# Patient Record
Sex: FEMALE | Race: ASIAN | Employment: FULL TIME | URBAN - METROPOLITAN AREA
[De-identification: names, ages, dates, MRNs, and addresses within clinical notes are randomized per-mention and may not be internally consistent; named-entity substitution may affect disease eponyms.]

---

## 2018-05-09 ENCOUNTER — OFFICE VISIT (OUTPATIENT)
Dept: FAMILY MEDICINE CLINIC | Age: 23
End: 2018-05-09

## 2018-05-09 VITALS
TEMPERATURE: 98.1 F | RESPIRATION RATE: 16 BRPM | OXYGEN SATURATION: 97 % | SYSTOLIC BLOOD PRESSURE: 130 MMHG | WEIGHT: 138.4 LBS | HEIGHT: 58 IN | HEART RATE: 87 BPM | BODY MASS INDEX: 29.05 KG/M2 | DIASTOLIC BLOOD PRESSURE: 76 MMHG

## 2018-05-09 DIAGNOSIS — Z13.220 SCREENING FOR HYPERLIPIDEMIA: ICD-10-CM

## 2018-05-09 DIAGNOSIS — Z13.1 SCREENING FOR DIABETES MELLITUS: ICD-10-CM

## 2018-05-09 DIAGNOSIS — G47.9 SLEEP TROUBLE: ICD-10-CM

## 2018-05-09 DIAGNOSIS — Z13.0 SCREENING FOR DEFICIENCY ANEMIA: ICD-10-CM

## 2018-05-09 DIAGNOSIS — R53.82 CHRONIC FATIGUE: Primary | ICD-10-CM

## 2018-05-09 DIAGNOSIS — M25.561 RIGHT KNEE PAIN, UNSPECIFIED CHRONICITY: ICD-10-CM

## 2018-05-09 RX ORDER — BISMUTH SUBSALICYLATE 262 MG
1 TABLET,CHEWABLE ORAL DAILY
COMMUNITY

## 2018-05-09 NOTE — PROGRESS NOTES
1. Have you been to the ER, urgent care clinic since your last visit? Hospitalized since your last visit? No    2. Have you seen or consulted any other health care providers outside of the [address] [place] [address] since your last visit? Include any pap smears or colon screening. No    Patient would also like a physical exam today; she was advised to discuss physical with Dr. Jerris Sever, as she had other concerns that she wanted to address today as well. Patient aware insurance will only cover physical visit if only preventative care is discussed today. Patient verbalized understanding; will to come back if necessary.

## 2018-05-09 NOTE — PROGRESS NOTES
HISTORY OF PRESENT ILLNESS  Juan Miguel Wheeler is a 23 y.o. female. HPI: Here to get establish care. C/o chronic fatigue. Going on since long time. Also some trouble sleeping since has job changed. 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 Some fatigue related to her sleep pattern./ able to sleep ok it is just change in shift. No mood changes. Some stress related to work as new work environment and trying to adjust. No mood changes. No known thyroid problem. C/o rt knee pain and cracking sound with banding down. No swelling. Generalize pain, mild in intensity. No radiation of pain. Sometimes after 12 hours shift and long hours of work it does get worse with pain. No loss of balance. No radiation of pain. It is currently mild in intensity. discussed high BMI. Discussed diet modification, calorie count and exercise. Discussed importance of weight loss. agree to do exercise and life style modification. Diet and exercise hand out given in AVS.   For now limitation in doing exercise from knee pain but discussed diet modification. Visit Vitals    /76 (BP 1 Location: Left arm, BP Patient Position: Sitting)    Pulse 87    Temp 98.1 °F (36.7 °C) (Oral)    Resp 16    Ht 4' 10.25\" (1.48 m)    Wt 138 lb 6.4 oz (62.8 kg)    SpO2 97%    BMI 28.68 kg/m2     Review medication list, vitals, problem list,allergies. Denies any headache, dizziness, no chest pain or trouble breathing, no arm or leg weakness. No nausea or vomiting, no weight or appetite changes, no depression or anxiety. No urine or bowel complains, no palpitation, no diaphoresis. No abdominal pain. ROS: see HPI     Physical Exam   Constitutional: She is oriented to person, place, and time. No distress. Cardiovascular: Normal rate, regular rhythm and normal heart sounds. Pulmonary/Chest:   CTA   Abdominal: Soft. Bowel sounds are normal. There is no tenderness. Musculoskeletal: She exhibits no edema. Rt knee: no swelling or redness. ROM wnl. Crepitus on flexion    Neurological: She is oriented to person, place, and time. ASSESSMENT and PLAN    ICD-10-CM ICD-9-CM    1. Chronic fatigue: checking labs. More looks like night shifts and work related. R53.82 780.79 CBC W/O DIFF      METABOLIC PANEL, COMPREHENSIVE      TSH 3RD GENERATION      VITAMIN D, 25 HYDROXY   2. Right knee pain, unspecified chronicity: obtain x-ray. Ice application and home exercise. M25.561 719.46 XR KNEE RT MAX 2 VWS   3. Sleep trouble: probably from work. Will observe. G47.9 780.50     works night shift    4. Screening for deficiency anemia Z13.0 V78.1 CBC W/O DIFF   5. Screening for diabetes mellitus Z13.1 V77.1 HEMOGLOBIN A1C WITH EAG   6. Screening for hyperlipidemia Z13.220 V77.91 LIPID PANEL   7. BMI 28.0-28.9,adult: discussed high BMI. Discussed diet modification, calorie count and exercise. Discussed importance of weight loss. agree to do exercise and life style modification. Diet and exercise hand out given in AVS.   See HPI  G85.35 V85.24    Pt understood and agree with the plan   Review HM   Follow-up Disposition:  Return in about 3 months (around 8/9/2018).

## 2018-05-09 NOTE — MR AVS SNAPSHOT
[address] [address] [facility] 
[phone] Patient: Lacey Solorzano MRN: [id number] :1995 Visit Information Date & Time Provider Department Dept. Phone Encounter #  
 2018  1:30 PM Renny Vera, [address] [facility] [phone] [id number] Follow-up Instructions Return in about 3 months (around 2018). Upcoming Health Maintenance Date Due  
 HPV Age 9Y-34Y (1 of 1 - Female 3 Dose Series) 2004 Influenza Age 5 to Adult 2018 PAP AKA CERVICAL CYTOLOGY 2019 DTaP/Tdap/Td series (5 - Td) 2022 Allergies as of 2018  Review Complete On: 2018 By: Renny Vera MD  
 No Known Allergies Current Immunizations  Reviewed on 7/15/2015 Name Date DTAP Vaccine 1994, 1994, 1994 HIB Vaccine 1995, 1994, 1994, 1994 Hepatitis B Vaccine 2006, 2005, 2005 Influenza Vaccine 9/10/2015  2:35 PM  
 Influenza Vaccine (Quad) PF 10/14/2016 Influenza Vaccine PF 10/8/2014 MMR Vaccine 2012, 1994 Meningococcal Vaccine 2012 OPV 1994, 1994, 1994 PPD 1994 TB Skin Test (PPD) Intradermal 2016, 7/15/2015, 2014  2:00 PM, 2014  2:45 PM  
 TDAP Vaccine 2012 Varicella Virus Vaccine 2015, 2014 Varicella Virus Vaccine Live 2012 Not reviewed this visit You Were Diagnosed With   
  
 Codes Comments Chronic fatigue    -  Primary ICD-10-CM: R53.82 
ICD-9-CM: 780.79 Right knee pain, unspecified chronicity     ICD-10-CM: M25.561 ICD-9-CM: 719.46 Sleep trouble     ICD-10-CM: G47.9 ICD-9-CM: 780.50 works night shift Screening for deficiency anemia     ICD-10-CM: Z13.0 ICD-9-CM: V78.1 Screening for diabetes mellitus     ICD-10-CM: Z13.1 ICD-9-CM: V77.1  Screening for hyperlipidemia     ICD-10-CM: Z13.220 
 ICD-9-CM: V77.91   
 BMI 28.0-28.9,adult     ICD-10-CM: B79.38 
ICD-9-CM: V85.24 Vitals BP Pulse Temp Resp Height(growth percentile) Weight(growth percentile) 130/76 (BP 1 Location: Left arm, BP Patient Position: Sitting) 87 98.1 °F (36.7 °C) (Oral) 16 4' 10.25\" (1.48 m) 138 lb 6.4 oz (62.8 kg) LMP SpO2 BMI OB Status Smoking Status 05/04/2018 97% 28.68 kg/m2 Having regular periods Never Smoker Vitals History BMI and BSA Data Body Mass Index Body Surface Area  
 28.68 kg/m 2 1.61 m 2 Preferred Pharmacy Pharmacy Name Phone [facility] [phone] [facility] [phone] Your Updated Medication List  
  
   
This list is accurate as of 5/9/18  2:33 PM.  Always use your most recent med list.  
  
  
  
  
 multivitamin tablet Commonly known as:  ONE A DAY Take 1 Tab by mouth daily. Follow-up Instructions Return in about 3 months (around 8/9/2018). To-Do List   
 05/09/2018 Lab:  CBC W/O DIFF   
  
 05/09/2018 Lab:  HEMOGLOBIN A1C WITH EAG   
  
 05/09/2018 Lab:  LIPID PANEL   
  
 05/09/2018 Lab:  METABOLIC PANEL, COMPREHENSIVE   
  
 05/09/2018 Lab:  TSH 3RD GENERATION   
  
 05/09/2018 Lab:  VITAMIN D, 25 HYDROXY   
  
 05/09/2018 Imaging:  XR KNEE RT MAX 2 VWS Patient Instructions Fatigue: Care Instructions Your Care Instructions Fatigue is a feeling of tiredness, exhaustion, or lack of energy. You may feel fatigue because of too much or not enough activity. It can also come from stress, lack of sleep, boredom, and poor diet. Many medical problems, such as viral infections, can cause fatigue. Emotional problems, especially depression, are often the cause of fatigue. Fatigue is most often a symptom of another problem. Treatment for fatigue depends on the cause.  For example, if you have fatigue because you have a certain health problem, treating this problem also treats your fatigue. If depression or anxiety is the cause, treatment may help. Follow-up care is a key part of your treatment and safety. Be sure to make and go to all appointments, and call your doctor if you are having problems. It's also a good idea to know your test results and keep a list of the medicines you take. How can you care for yourself at home? · Get regular exercise. But don't overdo it. Go back and forth between rest and exercise. · Get plenty of rest. 
· Eat a healthy diet. Do not skip meals, especially breakfast. 
· Reduce your use of caffeine, tobacco, and alcohol. Caffeine is most often found in coffee, tea, cola drinks, and chocolate. · Limit medicines that can cause fatigue. This includes tranquilizers and cold and allergy medicines. When should you call for help? Watch closely for changes in your health, and be sure to contact your doctor if: 
? · You have new symptoms such as fever or a rash. ? · Your fatigue gets worse. ? · You have been feeling down, depressed, or hopeless. Or you may have lost interest in things that you usually enjoy. ? · You are not getting better as expected. Where can you learn more? Go to http://misha-blue.info/. Enter N586 in the search box to learn more about \"Fatigue: Care Instructions. \" Current as of: March 20, 2017 Content Version: 11.4 © 7159-8850 [employer]. Care instructions adapted under license by [employer] (which disclaims liability or warranty for this information). If you have questions about a medical condition or this instruction, always ask your healthcare professional. [address] [address] any warranty or liability for your use of this information. Learning About Sleeping Well What does sleeping well mean? Sleeping well means getting enough sleep. How much sleep is enough varies among people. The number of hours you sleep is not as important as how you feel when you wake up. If you do not feel refreshed, you probably need more sleep. Another sign of not getting enough sleep is feeling tired during the day. The average total nightly sleep time is 7½ to 8 hours. Healthy adults may need a little more or a little less than this. Why is getting enough sleep important? Getting enough quality sleep is a basic part of good health. When your sleep suffers, your mood and your thoughts can suffer too. You may find yourself feeling more grumpy or stressed. Not getting enough sleep also can lead to serious problems, including injury, accidents, anxiety, and depression. What might cause poor sleeping? Many things can cause sleep problems, including: · Stress. Stress can be caused by fear about a single event, such as giving a speech. Or you may have ongoing stress, such as worry about work or school. · Depression, anxiety, and other mental or emotional conditions. · Changes in your sleep habits or surroundings. This includes changes that happen where you sleep, such as noise, light, or sleeping in a different bed. It also includes changes in your sleep pattern, such as having jet lag or working a late shift. · Health problems, such as pain, breathing problems, and restless legs syndrome. · Lack of regular exercise. How can you help yourself? Here are some tips that may help you sleep more soundly and wake up feeling more refreshed. Your sleeping area · Use your bedroom only for sleeping and sex. A bit of light reading may help you fall asleep. But if it doesn't, do your reading elsewhere in the house. Don't watch TV in bed. · Be sure your bed is big enough to stretch out comfortably, especially if you have a sleep partner. · Keep your bedroom quiet, dark, and cool. Use curtains, blinds, or a sleep mask to block out light. To block out noise, use earplugs, soothing music, or a \"white noise\" machine. Your evening and bedtime routine · Create a relaxing bedtime routine. You might want to take a warm shower or bath, listen to soothing music, or drink a cup of noncaffeinated tea. · Go to bed at the same time every night. And get up at the same time every morning, even if you feel tired. What to avoid · Limit caffeine (coffee, tea, caffeinated sodas) during the day, and don't have any for at least 4 to 6 hours before bedtime. · Don't drink alcohol before bedtime. Alcohol can cause you to wake up more often during the night. · Don't smoke or use tobacco, especially in the evening. Nicotine can keep you awake. · Don't take naps during the day, especially close to bedtime. · Don't lie in bed awake for too long. If you can't fall asleep, or if you wake up in the middle of the night and can't get back to sleep within 15 minutes or so, get out of bed and go to another room until you feel sleepy. · Don't take medicine right before bed that may keep you awake or make you feel hyper or energized. Your doctor can tell you if your medicine may do this and if you can take it earlier in the day. If you can't sleep · Imagine yourself in a peaceful, pleasant scene. Focus on the details and feelings of being in a place that is relaxing. · Get up and do a quiet or boring activity until you feel sleepy. · Don't drink any liquids after 6 p.m. if you wake up often because you have to go to the bathroom. Where can you learn more? Go to http://misha-blue.info/. Enter R849 in the search box to learn more about \"Learning About Sleeping Well. \" Current as of: May 12, 2017 Content Version: 11.4 © 9894-4584 [employer], [employer]. Care instructions adapted under license by [employer] (which disclaims liability or warranty for this information).  If you have questions about a medical condition or this instruction, always ask your healthcare professional. [employer] disclaims any warranty or liability for your use of this information. Knee: Exercises Your Care Instructions Here are some examples of exercises for your knee. Start each exercise slowly. Ease off the exercise if you start to have pain. Your doctor or physical therapist will tell you when you can start these exercises and which ones will work best for you. How to do the exercises [facility] [employer] 1. Sit with your leg straight and supported on the floor or a firm bed. (If you feel discomfort in the front or back of your knee, place a small towel roll under your knee.) 2. Tighten the muscles on top of your thigh by pressing the back of your knee flat down to the floor. (If you feel discomfort under your kneecap, place a small towel roll under your knee.) 3. Hold for about 6 seconds, then rest for up to 10 seconds. 4. Do 8 to 12 repetitions several times a day. Straight-leg raises to the front 1. Lie on your back with your good knee bent so that your foot rests flat on the floor. Your injured leg should be straight. Make sure that your low back has a normal curve. You should be able to slip your flat hand in between the floor and the small of your back, with your palm touching the floor and your back touching the back of your hand. 2. Tighten the thigh muscles in the injured leg by pressing the back of your knee flat down to the floor. Hold your knee straight. 3. Keeping the thigh muscles tight, lift your injured leg up so that your heel is about 12 inches off the floor. Hold for about 6 seconds and then lower slowly. 4. Do 8 to 12 repetitions, 3 times a day. Straight-leg raises to the outside 1. Lie on your side, with your injured leg on top. 2. Tighten the front thigh muscles of your injured leg to keep your knee straight. 3. Keep your hip and your leg straight in line with the rest of your body, and keep your knee pointing forward. Do not drop your hip back. 4. Lift your injured leg straight up toward the ceiling, about 12 inches off the floor. Hold for about 6 seconds, then slowly lower your leg. 5. Do 8 to 12 repetitions. Straight-leg raises to the back 1. Lie on your stomach, and lift your leg straight up behind you (toward the ceiling). 2. Lift your toes about 6 inches off the floor, hold for about 6 seconds, then lower slowly. 3. Do 8 to 12 repetitions. Straight-leg raises to the inside 1. Lie on the side of your body with the injured leg. 2. You can either prop your other (good) leg up on a chair, or you can bend your good knee and put that foot in front of your injured knee. Do not drop your hip back. 3. Tighten the muscles on the front of your thigh to straighten your injured knee. 4. Keep your kneecap pointing forward, and lift your whole leg up toward the ceiling about 6 inches. Hold for about 6 seconds, then lower slowly. 5. Do 8 to 12 repetitions. Heel dig bridging 1. Lie on your back with both knees bent and your ankles bent so that only your heels are digging into the floor. Your knees should be bent about 90 degrees. 2. Then push your heels into the floor, squeeze your buttocks, and lift your hips off the floor until your shoulders, hips, and knees are all in a straight line. 3. Hold for about 6 seconds as you continue to breathe normally, and then slowly lower your hips back down to the floor and rest for up to 10 seconds. 4. Do 8 to 12 repetitions. Hamstring curls 1. Lie on your stomach with your knees straight. If your kneecap is uncomfortable, roll up a washcloth and put it under your leg just above your kneecap. 2. Lift the foot of your injured leg by bending the knee so that you bring the foot up toward your buttock. If this motion hurts, try it without bending your knee quite as far. This may help you avoid any painful motion. 3. Slowly lower your leg back to the floor. 4. Do 8 to 12 repetitions. 5. With permission from your doctor or physical therapist, you may also want to add a cuff weight to your ankle (not more than 5 pounds). With weight, you do not have to lift your leg more than 12 inches to get a hamstring workout. Shallow standing knee bends Do this exercise only if you have very little pain; if you have no clicking, locking, or giving way if you have an injured knee; and if it does not hurt while you are doing 8 to 12 repetitions. 1. Stand with your hands lightly resting on a counter or chair in front of you. Put your feet shoulder-width apart. 2. Slowly bend your knees so that you squat down like you are going to sit in a chair. Make sure your knees do not go in front of your toes. 3. Lower yourself about 6 inches. Your heels should remain on the floor at all times. 4. Rise slowly to a standing position. Heel raises 1. Stand with your feet a few inches apart, with your hands lightly resting on a counter or chair in front of you. 2. Slowly raise your heels off the floor while keeping your knees straight. 3. Hold for about 6 seconds, then slowly lower your heels to the floor. 4. Do 8 to 12 repetitions several times during the day. Follow-up care is a key part of your treatment and safety. Be sure to make and go to all appointments, and call your doctor if you are having problems. It's also a good idea to know your test results and keep a list of the medicines you take. Where can you learn more? Go to http://misha-blue.info/. Enter L158 in the search box to learn more about \"Knee: Exercises. \" Current as of: March 21, 2017 Content Version: 11.4 © 6276-0955 [employer]. Care instructions adapted under license by [employer] (which disclaims liability or warranty for this information).  If you have questions about a medical condition or this instruction, always ask your healthcare professional. [employer] disclaims any warranty or liability for your use of this information. Introducing [facility]! [employer] introduces Loogla patient portal. Now you can access parts of your medical record, email your doctor's office, and request medication refills online. 1. In your internet browser, go to [email or web address] 2. Click on the First Time User? Click Here link in the Sign In box. You will see the New Member Sign Up page. 3. Enter your Loogla Access Code exactly as it appears below. You will not need to use this code after youve completed the sign-up process. If you do not sign up before the expiration date, you must request a new code. · Loogla Access Code: [email or web address] Expires: 8/7/2018  1:57 PM 
 
4. Enter the last four digits of your Social Security Number (xxxx) and Date of Birth (mm/dd/yyyy) as indicated and click Submit. You will be taken to the next sign-up page. 5. Create a Loogla ID. This will be your Loogla login ID and cannot be changed, so think of one that is secure and easy to remember. 6. Create a Loogla password. You can change your password at any time. 7. Enter your Password Reset Question and Answer. This can be used at a later time if you forget your password. 8. Enter your e-mail address. You will receive e-mail notification when new information is available in [address] 9. Click Sign Up. You can now view and download portions of your medical record. 10. Click the Download Summary menu link to download a portable copy of your medical information. If you have questions, please visit the Frequently Asked Questions section of the Loogla website. Remember, Loogla is NOT to be used for urgent needs. For medical emergencies, dial 911. Now available from your iPhone and Android! Please provide this summary of care documentation to your next provider. Your primary care clinician is listed as Mariaelena Vazquez. If you have any questions after today's visit, please call [phone].

## 2018-05-09 NOTE — PATIENT INSTRUCTIONS
Fatigue: Care Instructions  Your Care Instructions    Fatigue is a feeling of tiredness, exhaustion, or lack of energy. You may feel fatigue because of too much or not enough activity. It can also come from stress, lack of sleep, boredom, and poor diet. Many medical problems, such as viral infections, can cause fatigue. Emotional problems, especially depression, are often the cause of fatigue. Fatigue is most often a symptom of another problem. Treatment for fatigue depends on the cause. For example, if you have fatigue because you have a certain health problem, treating this problem also treats your fatigue. If depression or anxiety is the cause, treatment may help. Follow-up care is a key part of your treatment and safety. Be sure to make and go to all appointments, and call your doctor if you are having problems. It's also a good idea to know your test results and keep a list of the medicines you take. How can you care for yourself at home? · Get regular exercise. But don't overdo it. Go back and forth between rest and exercise. · Get plenty of rest.  · Eat a healthy diet. Do not skip meals, especially breakfast.  · Reduce your use of caffeine, tobacco, and alcohol. Caffeine is most often found in coffee, tea, cola drinks, and chocolate. · Limit medicines that can cause fatigue. This includes tranquilizers and cold and allergy medicines. When should you call for help? Watch closely for changes in your health, and be sure to contact your doctor if:  ? · You have new symptoms such as fever or a rash. ? · Your fatigue gets worse. ? · You have been feeling down, depressed, or hopeless. Or you may have lost interest in things that you usually enjoy. ? · You are not getting better as expected. Where can you learn more? Go to http://misha-blue.info/. Enter T044 in the search box to learn more about \"Fatigue: Care Instructions. \"  Current as of: March 20, 2017  Content Version: 11.4  © 2925-6240 [employer], "[employer]". Care instructions adapted under license by [employer] (which disclaims liability or warranty for this information). If you have questions about a medical condition or this instruction, always ask your healthcare professional. [address] any warranty or liability for your use of this information. Learning About Sleeping Well  What does sleeping well mean? Sleeping well means getting enough sleep. How much sleep is enough varies among people. The number of hours you sleep is not as important as how you feel when you wake up. If you do not feel refreshed, you probably need more sleep. Another sign of not getting enough sleep is feeling tired during the day. The average total nightly sleep time is 7½ to 8 hours. Healthy adults may need a little more or a little less than this. Why is getting enough sleep important? Getting enough quality sleep is a basic part of good health. When your sleep suffers, your mood and your thoughts can suffer too. You may find yourself feeling more grumpy or stressed. Not getting enough sleep also can lead to serious problems, including injury, accidents, anxiety, and depression. What might cause poor sleeping? Many things can cause sleep problems, including:  · Stress. Stress can be caused by fear about a single event, such as giving a speech. Or you may have ongoing stress, such as worry about work or school. · Depression, anxiety, and other mental or emotional conditions. · Changes in your sleep habits or surroundings. This includes changes that happen where you sleep, such as noise, light, or sleeping in a different bed. It also includes changes in your sleep pattern, such as having jet lag or working a late shift. · Health problems, such as pain, breathing problems, and restless legs syndrome. · Lack of regular exercise. How can you help yourself?   Here are some tips that may help you sleep more soundly and wake up feeling more refreshed. Your sleeping area  · Use your bedroom only for sleeping and sex. A bit of light reading may help you fall asleep. But if it doesn't, do your reading elsewhere in the house. Don't watch TV in bed. · Be sure your bed is big enough to stretch out comfortably, especially if you have a sleep partner. · Keep your bedroom quiet, dark, and cool. Use curtains, blinds, or a sleep mask to block out light. To block out noise, use earplugs, soothing music, or a \"white noise\" machine. Your evening and bedtime routine  · Create a relaxing bedtime routine. You might want to take a warm shower or bath, listen to soothing music, or drink a cup of noncaffeinated tea. · Go to bed at the same time every night. And get up at the same time every morning, even if you feel tired. What to avoid  · Limit caffeine (coffee, tea, caffeinated sodas) during the day, and don't have any for at least 4 to 6 hours before bedtime. · Don't drink alcohol before bedtime. Alcohol can cause you to wake up more often during the night. · Don't smoke or use tobacco, especially in the evening. Nicotine can keep you awake. · Don't take naps during the day, especially close to bedtime. · Don't lie in bed awake for too long. If you can't fall asleep, or if you wake up in the middle of the night and can't get back to sleep within 15 minutes or so, get out of bed and go to another room until you feel sleepy. · Don't take medicine right before bed that may keep you awake or make you feel hyper or energized. Your doctor can tell you if your medicine may do this and if you can take it earlier in the day. If you can't sleep  · Imagine yourself in a peaceful, pleasant scene. Focus on the details and feelings of being in a place that is relaxing. · Get up and do a quiet or boring activity until you feel sleepy.   · Don't drink any liquids after 6 p.m. if you wake up often because you have to go to the bathroom. Where can you learn more? Go to http://misha-blue.info/. Enter H173 in the search box to learn more about \"Learning About Sleeping Well. \"  Current as of: May 12, 2017  Content Version: 11.4  © 8852-4085 [employer]. Care instructions adapted under license by [employer] (which disclaims liability or warranty for this information). If you have questions about a medical condition or this instruction, always ask your healthcare professional. [address] any warranty or liability for your use of this information. Knee: Exercises  Your Care Instructions  Here are some examples of exercises for your knee. Start each exercise slowly. Ease off the exercise if you start to have pain. Your doctor or physical therapist will tell you when you can start these exercises and which ones will work best for you. How to do the exercises  Quad sets    1. Sit with your leg straight and supported on the floor or a firm bed. (If you feel discomfort in the front or back of your knee, place a small towel roll under your knee.)  2. Tighten the muscles on top of your thigh by pressing the back of your knee flat down to the floor. (If you feel discomfort under your kneecap, place a small towel roll under your knee.)  3. Hold for about 6 seconds, then rest for up to 10 seconds. 4. Do 8 to 12 repetitions several times a day. Straight-leg raises to the front    1. Lie on your back with your good knee bent so that your foot rests flat on the floor. Your injured leg should be straight. Make sure that your low back has a normal curve. You should be able to slip your flat hand in between the floor and the small of your back, with your palm touching the floor and your back touching the back of your hand. 2. Tighten the thigh muscles in the injured leg by pressing the back of your knee flat down to the floor. Hold your knee straight.   3. Keeping the thigh muscles tight, lift your injured leg up so that your heel is about 12 inches off the floor. Hold for about 6 seconds and then lower slowly. 4. Do 8 to 12 repetitions, 3 times a day. Straight-leg raises to the outside    1. Lie on your side, with your injured leg on top. 2. Tighten the front thigh muscles of your injured leg to keep your knee straight. 3. Keep your hip and your leg straight in line with the rest of your body, and keep your knee pointing forward. Do not drop your hip back. 4. Lift your injured leg straight up toward the ceiling, about 12 inches off the floor. Hold for about 6 seconds, then slowly lower your leg. 5. Do 8 to 12 repetitions. Straight-leg raises to the back    1. Lie on your stomach, and lift your leg straight up behind you (toward the ceiling). 2. Lift your toes about 6 inches off the floor, hold for about 6 seconds, then lower slowly. 3. Do 8 to 12 repetitions. Straight-leg raises to the inside    1. Lie on the side of your body with the injured leg. 2. You can either prop your other (good) leg up on a chair, or you can bend your good knee and put that foot in front of your injured knee. Do not drop your hip back. 3. Tighten the muscles on the front of your thigh to straighten your injured knee. 4. Keep your kneecap pointing forward, and lift your whole leg up toward the ceiling about 6 inches. Hold for about 6 seconds, then lower slowly. 5. Do 8 to 12 repetitions. Heel dig bridging    1. Lie on your back with both knees bent and your ankles bent so that only your heels are digging into the floor. Your knees should be bent about 90 degrees. 2. Then push your heels into the floor, squeeze your buttocks, and lift your hips off the floor until your shoulders, hips, and knees are all in a straight line. 3. Hold for about 6 seconds as you continue to breathe normally, and then slowly lower your hips back down to the floor and rest for up to 10 seconds.   4. Do 8 to 12 repetitions. Hamstring curls    1. Lie on your stomach with your knees straight. If your kneecap is uncomfortable, roll up a washcloth and put it under your leg just above your kneecap. 2. Lift the foot of your injured leg by bending the knee so that you bring the foot up toward your buttock. If this motion hurts, try it without bending your knee quite as far. This may help you avoid any painful motion. 3. Slowly lower your leg back to the floor. 4. Do 8 to 12 repetitions. 5. With permission from your doctor or physical therapist, you may also want to add a cuff weight to your ankle (not more than 5 pounds). With weight, you do not have to lift your leg more than 12 inches to get a hamstring workout. Shallow standing knee bends    Do this exercise only if you have very little pain; if you have no clicking, locking, or giving way if you have an injured knee; and if it does not hurt while you are doing 8 to 12 repetitions. 1. Stand with your hands lightly resting on a counter or chair in front of you. Put your feet shoulder-width apart. 2. Slowly bend your knees so that you squat down like you are going to sit in a chair. Make sure your knees do not go in front of your toes. 3. Lower yourself about 6 inches. Your heels should remain on the floor at all times. 4. Rise slowly to a standing position. Heel raises    1. Stand with your feet a few inches apart, with your hands lightly resting on a counter or chair in front of you. 2. Slowly raise your heels off the floor while keeping your knees straight. 3. Hold for about 6 seconds, then slowly lower your heels to the floor. 4. Do 8 to 12 repetitions several times during the day. Follow-up care is a key part of your treatment and safety. Be sure to make and go to all appointments, and call your doctor if you are having problems. It's also a good idea to know your test results and keep a list of the medicines you take. Where can you learn more?   Go to http://misha-blue.info/. Enter B658 in the search box to learn more about \"Knee: Exercises. \"  Current as of: March 21, 2017  Content Version: 11.4  © 4271-5582 [employer]. Care instructions adapted under license by [employer] (which disclaims liability or warranty for this information). If you have questions about a medical condition or this instruction, always ask your healthcare professional. [place] [address] any warranty or liability for your use of this information.

## 2018-08-03 ENCOUNTER — HOSPITAL ENCOUNTER (OUTPATIENT)
Dept: LAB | Age: 23
Discharge: HOME | End: 2018-08-03
Payer: COMMERCIAL

## 2018-08-03 ENCOUNTER — HOSPITAL ENCOUNTER (OUTPATIENT)
Dept: GENERAL RADIOLOGY | Age: 23
Discharge: HOME | End: 2018-08-03
Payer: COMMERCIAL

## 2018-08-03 DIAGNOSIS — Z13.0 SCREENING FOR DEFICIENCY ANEMIA: ICD-10-CM

## 2018-08-03 DIAGNOSIS — Z13.1 SCREENING FOR DIABETES MELLITUS: ICD-10-CM

## 2018-08-03 DIAGNOSIS — R53.82 CHRONIC FATIGUE: ICD-10-CM

## 2018-08-03 DIAGNOSIS — Z13.220 SCREENING FOR HYPERLIPIDEMIA: ICD-10-CM

## 2018-08-03 DIAGNOSIS — E55.9 VITAMIN D DEFICIENCY: Primary | ICD-10-CM

## 2018-08-03 DIAGNOSIS — M25.561 RIGHT KNEE PAIN, UNSPECIFIED CHRONICITY: ICD-10-CM

## 2018-08-03 LAB
25(OH)D3 SERPL-MCNC: 15.1 NG/ML (ref 30–100)
ALBUMIN SERPL-MCNC: 3.9 G/DL (ref 3.4–5)
ALBUMIN/GLOB SERPL: 1.1 {RATIO} (ref 0.8–1.7)
ALP SERPL-CCNC: 56 U/L (ref 45–117)
ALT SERPL-CCNC: 24 U/L (ref 13–56)
ANION GAP SERPL CALC-SCNC: 7 MMOL/L (ref 3–18)
AST SERPL-CCNC: 12 U/L (ref 15–37)
BILIRUB SERPL-MCNC: 0.4 MG/DL (ref 0.2–1)
BUN SERPL-MCNC: 9 MG/DL (ref 7–18)
BUN/CREAT SERPL: 14 (ref 12–20)
CALCIUM SERPL-MCNC: 8.8 MG/DL (ref 8.5–10.1)
CHLORIDE SERPL-SCNC: 106 MMOL/L (ref 100–108)
CHOLEST SERPL-MCNC: 141 MG/DL
CO2 SERPL-SCNC: 28 MMOL/L (ref 21–32)
CREAT SERPL-MCNC: 0.65 MG/DL (ref 0.6–1.3)
ERYTHROCYTE [DISTWIDTH] IN BLOOD BY AUTOMATED COUNT: 11.6 % (ref 11.6–14.5)
EST. AVERAGE GLUCOSE BLD GHB EST-MCNC: 108 MG/DL
GLOBULIN SER CALC-MCNC: 3.6 G/DL (ref 2–4)
GLUCOSE SERPL-MCNC: 91 MG/DL (ref 74–99)
HBA1C MFR BLD: 5.4 % (ref 4.2–5.6)
HCT VFR BLD AUTO: 40.2 % (ref 35–45)
HDLC SERPL-MCNC: 49 MG/DL (ref 40–60)
HDLC SERPL: 2.9 {RATIO} (ref 0–5)
HGB BLD-MCNC: 13.8 G/DL (ref 12–16)
LDLC SERPL CALC-MCNC: 79.2 MG/DL (ref 0–100)
LIPID PROFILE,FLP: NORMAL
MCH RBC QN AUTO: 32.2 PG (ref 24–34)
MCHC RBC AUTO-ENTMCNC: 34.3 G/DL (ref 31–37)
MCV RBC AUTO: 93.7 FL (ref 74–97)
PLATELET # BLD AUTO: 332 K/UL (ref 135–420)
PMV BLD AUTO: 10.1 FL (ref 9.2–11.8)
POTASSIUM SERPL-SCNC: 4 MMOL/L (ref 3.5–5.5)
PROT SERPL-MCNC: 7.5 G/DL (ref 6.4–8.2)
RBC # BLD AUTO: 4.29 M/UL (ref 4.2–5.3)
SODIUM SERPL-SCNC: 141 MMOL/L (ref 136–145)
TRIGL SERPL-MCNC: 64 MG/DL (ref ?–150)
TSH SERPL DL<=0.05 MIU/L-ACNC: 1.36 UIU/ML (ref 0.36–3.74)
VLDLC SERPL CALC-MCNC: 12.8 MG/DL
WBC # BLD AUTO: 7.5 K/UL (ref 4.6–13.2)

## 2018-08-03 PROCEDURE — 83036 HEMOGLOBIN GLYCOSYLATED A1C: CPT | Performed by: FAMILY MEDICINE

## 2018-08-03 PROCEDURE — 73560 X-RAY EXAM OF KNEE 1 OR 2: CPT

## 2018-08-03 PROCEDURE — 80053 COMPREHEN METABOLIC PANEL: CPT | Performed by: FAMILY MEDICINE

## 2018-08-03 PROCEDURE — 85027 COMPLETE CBC AUTOMATED: CPT | Performed by: FAMILY MEDICINE

## 2018-08-03 PROCEDURE — 36415 COLL VENOUS BLD VENIPUNCTURE: CPT | Performed by: FAMILY MEDICINE

## 2018-08-03 PROCEDURE — 80061 LIPID PANEL: CPT | Performed by: FAMILY MEDICINE

## 2018-08-03 PROCEDURE — 82306 VITAMIN D 25 HYDROXY: CPT | Performed by: FAMILY MEDICINE

## 2018-08-03 PROCEDURE — 84443 ASSAY THYROID STIM HORMONE: CPT | Performed by: FAMILY MEDICINE

## 2018-08-03 RX ORDER — ERGOCALCIFEROL 1.25 MG/1
50000 CAPSULE ORAL
Qty: 12 CAP | Refills: 0 | Status: SHIPPED | OUTPATIENT
Start: 2018-08-03 | End: 2018-11-13 | Stop reason: ALTCHOICE

## 2018-08-03 NOTE — PROGRESS NOTES
Let pt know that has low vitamin d level. For now giving drisdol and repeat labs in 3 months. Other labs fairly ok. Further discussion on follow up visit. Thanks.

## 2018-08-03 NOTE — PROGRESS NOTES
Let pt know that knee x-ray showed no acute findings. Continue current plan and further discussion on follow up visit. Thanks.

## 2018-08-03 NOTE — PROGRESS NOTES
Contacted patient and verified identity using name and date of birth (2- identifiers)  Spoke with patient and she verbalized understanding of negative xray results.

## 2018-08-03 NOTE — PROGRESS NOTES
Contacted patient and verified identity using name and date of birth (2- identifiers)  Spoke with patient and she verbalized understanding of low vitamin D and need for Rx Drisdol weekly for 12 weeks. Mailing lab order to recheck after Rx.

## 2018-08-13 ENCOUNTER — OFFICE VISIT (OUTPATIENT)
Dept: FAMILY MEDICINE CLINIC | Age: 23
End: 2018-08-13

## 2018-08-13 VITALS
SYSTOLIC BLOOD PRESSURE: 126 MMHG | DIASTOLIC BLOOD PRESSURE: 72 MMHG | OXYGEN SATURATION: 97 % | BODY MASS INDEX: 29.6 KG/M2 | TEMPERATURE: 98 F | HEIGHT: 58 IN | WEIGHT: 141 LBS | HEART RATE: 101 BPM | RESPIRATION RATE: 16 BRPM

## 2018-08-13 DIAGNOSIS — R53.82 CHRONIC FATIGUE: Primary | ICD-10-CM

## 2018-08-13 DIAGNOSIS — G47.9 SLEEP TROUBLE: ICD-10-CM

## 2018-08-13 DIAGNOSIS — E55.9 VITAMIN D DEFICIENCY: ICD-10-CM

## 2018-08-13 NOTE — PATIENT INSTRUCTIONS
Fatigue: Care Instructions  Your Care Instructions    Fatigue is a feeling of tiredness, exhaustion, or lack of energy. You may feel fatigue because of too much or not enough activity. It can also come from stress, lack of sleep, boredom, and poor diet. Many medical problems, such as viral infections, can cause fatigue. Emotional problems, especially depression, are often the cause of fatigue. Fatigue is most often a symptom of another problem. Treatment for fatigue depends on the cause. For example, if you have fatigue because you have a certain health problem, treating this problem also treats your fatigue. If depression or anxiety is the cause, treatment may help. Follow-up care is a key part of your treatment and safety. Be sure to make and go to all appointments, and call your doctor if you are having problems. It's also a good idea to know your test results and keep a list of the medicines you take. How can you care for yourself at home? · Get regular exercise. But don't overdo it. Go back and forth between rest and exercise. · Get plenty of rest.  · Eat a healthy diet. Do not skip meals, especially breakfast.  · Reduce your use of caffeine, tobacco, and alcohol. Caffeine is most often found in coffee, tea, cola drinks, and chocolate. · Limit medicines that can cause fatigue. This includes tranquilizers and cold and allergy medicines. When should you call for help? Watch closely for changes in your health, and be sure to contact your doctor if:    · You have new symptoms such as fever or a rash.     · Your fatigue gets worse.     · You have been feeling down, depressed, or hopeless. Or you may have lost interest in things that you usually enjoy.     · You are not getting better as expected. Where can you learn more? Go to http://misha-blue.info/. Enter X968 in the search box to learn more about \"Fatigue: Care Instructions. \"  Current as of: November 20, 2017  Content Version: 11.7  © 3202-9707 [employer]. Care instructions adapted under license by [employer] (which disclaims liability or warranty for this information). If you have questions about a medical condition or this instruction, always ask your healthcare professional. [address] any warranty or liability for your use of this information. Learning About Vitamin D  Why is it important to get enough vitamin D? Your body needs vitamin D to absorb calcium. Calcium keeps your bones and muscles, including your heart, healthy and strong. If your muscles don't get enough calcium, they can cramp, hurt, or feel weak. You may have long-term (chronic) muscle aches and pains. If you don't get enough vitamin D throughout life, you have an increased chance of having thin and brittle bones (osteoporosis) in your later years. Children who don't get enough vitamin D may not grow as much as others their age. They also have a chance of getting a rare disease called rickets. It causes weak bones. Vitamin D and calcium are added to many foods. And your body uses sunshine to make its own vitamin D. How much vitamin D do you need? The [place] [employer] recommends that people ages 3 through 79 get 600 IU (international units) every day. Adults 71 and older need 800 IU every day. Blood tests for vitamin D can check your vitamin D level. But there is no standard normal range used by all laboratories. The [place] [employer] recommends a blood level of 20 ng/mL of vitamin D for healthy bones. And most people in the [place] and [facility] meet this goal.  How can you get more vitamin D? Foods that contain vitamin D include:  · [place], tuna, and mackerel. These are some of the best foods to eat when you need to get more vitamin D.  · Cheese, egg yolks, and beef liver. These foods have vitamin D in small amounts.   · Milk, soy drinks, orange juice, yogurt, margarine, and some kinds of cereal have vitamin D added to them. Some people don't make vitamin D as well as others. They may have to take extra care in getting enough vitamin D. Things that reduce how much vitamin D your body makes include:  · Dark skin, such as many  Americans have. · Age, especially if you are older than 72. · Digestive problems, such as Crohn's or celiac disease. · Liver and kidney disease. Some people who do not get enough vitamin D may need supplements. Are there any risks from taking vitamin D?  · Too much vitamin D:  ¨ Can damage your kidneys. ¨ Can cause nausea and vomiting, constipation, and weakness. ¨ Raises the amount of calcium in your blood. If this happens, you can get confused or have an irregular heart rhythm. · Vitamin D may interact with other medicines. Tell your doctor about all of the medicines you take, including over-the-counter drugs, herbs, and pills. Tell your doctor about all of your current medical problems. Where can you learn more? Go to http://misha-blue.info/. Enter [id number] in the search box to learn more about \"Learning About Vitamin D.\"  Current as of: May 12, 2017  Content Version: 11.7  © 5981-8165 [employer]. Care instructions adapted under license by [employer] (which disclaims liability or warranty for this information). If you have questions about a medical condition or this instruction, always ask your healthcare professional. [place] [address] any warranty or liability for your use of this information. Learning About Sleeping Well  What does sleeping well mean? Sleeping well means getting enough sleep. How much sleep is enough varies among people. The number of hours you sleep is not as important as how you feel when you wake up. If you do not feel refreshed, you probably need more sleep. Another sign of not getting enough sleep is feeling tired during the day.   The average total nightly sleep time is 7½ to 8 hours. Healthy adults may need a little more or a little less than this. Why is getting enough sleep important? Getting enough quality sleep is a basic part of good health. When your sleep suffers, your mood and your thoughts can suffer too. You may find yourself feeling more grumpy or stressed. Not getting enough sleep also can lead to serious problems, including injury, accidents, anxiety, and depression. What might cause poor sleeping? Many things can cause sleep problems, including:  · Stress. Stress can be caused by fear about a single event, such as giving a speech. Or you may have ongoing stress, such as worry about work or school. · Depression, anxiety, and other mental or emotional conditions. · Changes in your sleep habits or surroundings. This includes changes that happen where you sleep, such as noise, light, or sleeping in a different bed. It also includes changes in your sleep pattern, such as having jet lag or working a late shift. · Health problems, such as pain, breathing problems, and restless legs syndrome. · Lack of regular exercise. How can you help yourself? Here are some tips that may help you sleep more soundly and wake up feeling more refreshed. Your sleeping area  · Use your bedroom only for sleeping and sex. A bit of light reading may help you fall asleep. But if it doesn't, do your reading elsewhere in the house. Don't watch TV in bed. · Be sure your bed is big enough to stretch out comfortably, especially if you have a sleep partner. · Keep your bedroom quiet, dark, and cool. Use curtains, blinds, or a sleep mask to block out light. To block out noise, use earplugs, soothing music, or a \"white noise\" machine. Your evening and bedtime routine  · Create a relaxing bedtime routine. You might want to take a warm shower or bath, listen to soothing music, or drink a cup of noncaffeinated tea. · Go to bed at the same time every night.  And get up at the same time every morning, even if you feel tired. What to avoid  · Limit caffeine (coffee, tea, caffeinated sodas) during the day, and don't have any for at least 4 to 6 hours before bedtime. · Don't drink alcohol before bedtime. Alcohol can cause you to wake up more often during the night. · Don't smoke or use tobacco, especially in the evening. Nicotine can keep you awake. · Don't take naps during the day, especially close to bedtime. · Don't lie in bed awake for too long. If you can't fall asleep, or if you wake up in the middle of the night and can't get back to sleep within 15 minutes or so, get out of bed and go to another room until you feel sleepy. · Don't take medicine right before bed that may keep you awake or make you feel hyper or energized. Your doctor can tell you if your medicine may do this and if you can take it earlier in the day. If you can't sleep  · Imagine yourself in a peaceful, pleasant scene. Focus on the details and feelings of being in a place that is relaxing. · Get up and do a quiet or boring activity until you feel sleepy. · Don't drink any liquids after 6 p.m. if you wake up often because you have to go to the bathroom. Where can you learn more? Go to http://misha-blue.info/. Enter I737 in the search box to learn more about \"Learning About Sleeping Well. \"  Current as of: December 7, 2017  Content Version: 11.7  © 5316-5421 [employer]. Care instructions adapted under license by [employer] (which disclaims liability or warranty for this information). If you have questions about a medical condition or this instruction, always ask your healthcare professional. [address] any warranty or liability for your use of this information. Learning About Sleeping Well  What does sleeping well mean? Sleeping well means getting enough sleep. How much sleep is enough varies among people.   The number of hours you sleep is not as important as how you feel when you wake up. If you do not feel refreshed, you probably need more sleep. Another sign of not getting enough sleep is feeling tired during the day. The average total nightly sleep time is 7½ to 8 hours. Healthy adults may need a little more or a little less than this. Why is getting enough sleep important? Getting enough quality sleep is a basic part of good health. When your sleep suffers, your mood and your thoughts can suffer too. You may find yourself feeling more grumpy or stressed. Not getting enough sleep also can lead to serious problems, including injury, accidents, anxiety, and depression. What might cause poor sleeping? Many things can cause sleep problems, including:  · Stress. Stress can be caused by fear about a single event, such as giving a speech. Or you may have ongoing stress, such as worry about work or school. · Depression, anxiety, and other mental or emotional conditions. · Changes in your sleep habits or surroundings. This includes changes that happen where you sleep, such as noise, light, or sleeping in a different bed. It also includes changes in your sleep pattern, such as having jet lag or working a late shift. · Health problems, such as pain, breathing problems, and restless legs syndrome. · Lack of regular exercise. How can you help yourself? Here are some tips that may help you sleep more soundly and wake up feeling more refreshed. Your sleeping area  · Use your bedroom only for sleeping and sex. A bit of light reading may help you fall asleep. But if it doesn't, do your reading elsewhere in the house. Don't watch TV in bed. · Be sure your bed is big enough to stretch out comfortably, especially if you have a sleep partner. · Keep your bedroom quiet, dark, and cool. Use curtains, blinds, or a sleep mask to block out light. To block out noise, use earplugs, soothing music, or a \"white noise\" machine.   Your evening and bedtime routine  · Create a relaxing bedtime routine. You might want to take a warm shower or bath, listen to soothing music, or drink a cup of noncaffeinated tea. · Go to bed at the same time every night. And get up at the same time every morning, even if you feel tired. What to avoid  · Limit caffeine (coffee, tea, caffeinated sodas) during the day, and don't have any for at least 4 to 6 hours before bedtime. · Don't drink alcohol before bedtime. Alcohol can cause you to wake up more often during the night. · Don't smoke or use tobacco, especially in the evening. Nicotine can keep you awake. · Don't take naps during the day, especially close to bedtime. · Don't lie in bed awake for too long. If you can't fall asleep, or if you wake up in the middle of the night and can't get back to sleep within 15 minutes or so, get out of bed and go to another room until you feel sleepy. · Don't take medicine right before bed that may keep you awake or make you feel hyper or energized. Your doctor can tell you if your medicine may do this and if you can take it earlier in the day. If you can't sleep  · Imagine yourself in a peaceful, pleasant scene. Focus on the details and feelings of being in a place that is relaxing. · Get up and do a quiet or boring activity until you feel sleepy. · Don't drink any liquids after 6 p.m. if you wake up often because you have to go to the bathroom. Where can you learn more? Go to http://misha-blue.info/. Enter O115 in the search box to learn more about \"Learning About Sleeping Well. \"  Current as of: December 7, 2017  Content Version: 11.7  © 9602-4280 [employer]. Care instructions adapted under license by [employer] (which disclaims liability or warranty for this information).  If you have questions about a medical condition or this instruction, always ask your healthcare professional. [employer] disclaims any warranty or liability for your use of this information. Knee: Exercises  Your Care Instructions  Here are some examples of exercises for your knee. Start each exercise slowly. Ease off the exercise if you start to have pain. Your doctor or physical therapist will tell you when you can start these exercises and which ones will work best for you. How to do the exercises  Quad sets    1. Sit with your leg straight and supported on the floor or a firm bed. (If you feel discomfort in the front or back of your knee, place a small towel roll under your knee.)  2. Tighten the muscles on top of your thigh by pressing the back of your knee flat down to the floor. (If you feel discomfort under your kneecap, place a small towel roll under your knee.)  3. Hold for about 6 seconds, then rest for up to 10 seconds. 4. Do 8 to 12 repetitions several times a day. Straight-leg raises to the front    1. Lie on your back with your good knee bent so that your foot rests flat on the floor. Your injured leg should be straight. Make sure that your low back has a normal curve. You should be able to slip your flat hand in between the floor and the small of your back, with your palm touching the floor and your back touching the back of your hand. 2. Tighten the thigh muscles in the injured leg by pressing the back of your knee flat down to the floor. Hold your knee straight. 3. Keeping the thigh muscles tight, lift your injured leg up so that your heel is about 12 inches off the floor. Hold for about 6 seconds and then lower slowly. 4. Do 8 to 12 repetitions, 3 times a day. Straight-leg raises to the outside    1. Lie on your side, with your injured leg on top. 2. Tighten the front thigh muscles of your injured leg to keep your knee straight. 3. Keep your hip and your leg straight in line with the rest of your body, and keep your knee pointing forward. Do not drop your hip back.   4. Lift your injured leg straight up toward the ceiling, about 12 inches off the floor. Hold for about 6 seconds, then slowly lower your leg. 5. Do 8 to 12 repetitions. Straight-leg raises to the back    1. Lie on your stomach, and lift your leg straight up behind you (toward the ceiling). 2. Lift your toes about 6 inches off the floor, hold for about 6 seconds, then lower slowly. 3. Do 8 to 12 repetitions. Straight-leg raises to the inside    1. Lie on the side of your body with the injured leg. 2. You can either prop your other (good) leg up on a chair, or you can bend your good knee and put that foot in front of your injured knee. Do not drop your hip back. 3. Tighten the muscles on the front of your thigh to straighten your injured knee. 4. Keep your kneecap pointing forward, and lift your whole leg up toward the ceiling about 6 inches. Hold for about 6 seconds, then lower slowly. 5. Do 8 to 12 repetitions. Heel dig bridging    1. Lie on your back with both knees bent and your ankles bent so that only your heels are digging into the floor. Your knees should be bent about 90 degrees. 2. Then push your heels into the floor, squeeze your buttocks, and lift your hips off the floor until your shoulders, hips, and knees are all in a straight line. 3. Hold for about 6 seconds as you continue to breathe normally, and then slowly lower your hips back down to the floor and rest for up to 10 seconds. 4. Do 8 to 12 repetitions. Hamstring curls    1. Lie on your stomach with your knees straight. If your kneecap is uncomfortable, roll up a washcloth and put it under your leg just above your kneecap. 2. Lift the foot of your injured leg by bending the knee so that you bring the foot up toward your buttock. If this motion hurts, try it without bending your knee quite as far. This may help you avoid any painful motion. 3. Slowly lower your leg back to the floor. 4. Do 8 to 12 repetitions.   5. With permission from your doctor or physical therapist, you may also want to add a cuff weight to your ankle (not more than 5 pounds). With weight, you do not have to lift your leg more than 12 inches to get a hamstring workout. Shallow standing knee bends    Do this exercise only if you have very little pain; if you have no clicking, locking, or giving way if you have an injured knee; and if it does not hurt while you are doing 8 to 12 repetitions. 1. Stand with your hands lightly resting on a counter or chair in front of you. Put your feet shoulder-width apart. 2. Slowly bend your knees so that you squat down like you are going to sit in a chair. Make sure your knees do not go in front of your toes. 3. Lower yourself about 6 inches. Your heels should remain on the floor at all times. 4. Rise slowly to a standing position. Heel raises    1. Stand with your feet a few inches apart, with your hands lightly resting on a counter or chair in front of you. 2. Slowly raise your heels off the floor while keeping your knees straight. 3. Hold for about 6 seconds, then slowly lower your heels to the floor. 4. Do 8 to 12 repetitions several times during the day. Follow-up care is a key part of your treatment and safety. Be sure to make and go to all appointments, and call your doctor if you are having problems. It's also a good idea to know your test results and keep a list of the medicines you take. Where can you learn more? Go to http://misha-blue.info/. Enter S328 in the search box to learn more about \"Knee: Exercises. \"  Current as of: November 29, 2017  Content Version: 11.7  © 4665-8147 [employer]. Care instructions adapted under license by [employer] (which disclaims liability or warranty for this information). If you have questions about a medical condition or this instruction, always ask your healthcare professional. [place] [address] any warranty or liability for your use of this information.

## 2018-08-13 NOTE — MR AVS SNAPSHOT
[address] [place] [address] 
[phone] Patient: Sandeep Burrell MRN: [id number] :1995 Visit Information Date & Time Provider Department Dept. Phone Encounter #  
 2018  1:45 PM Nazia Ram, [address] [place] [address] Road [id number] Follow-up Instructions Return in about 3 months (around 2018). Upcoming Health Maintenance Date Due  
 HPV Age 9Y-34Y (1 of 1 - Female 3 Dose Series) 2004 Influenza Age 5 to Adult 2018 PAP AKA CERVICAL CYTOLOGY 2019 DTaP/Tdap/Td series (5 - Td) 2022 Allergies as of 2018  Review Complete On: 2018 By: Nazia Ram MD  
 No Known Allergies Current Immunizations  Reviewed on 7/15/2015 Name Date DTAP Vaccine 1994, 1994, 1994 HIB Vaccine 1995, 1994, 1994, 1994 Hepatitis B Vaccine 2006, 2005, 2005 Influenza Vaccine 9/10/2015  2:35 PM  
 Influenza Vaccine (Quad) PF 10/14/2016 Influenza Vaccine PF 10/8/2014 MMR Vaccine 2012, 1994 Meningococcal Vaccine 2012 OPV 1994, 1994, 1994 PPD 1994 TB Skin Test (PPD) Intradermal 2016, 7/15/2015, 2014  2:00 PM, 2014  2:45 PM  
 TDAP Vaccine 2012 Varicella Virus Vaccine 2015, 2014 Varicella Virus Vaccine Live 2012 Not reviewed this visit You Were Diagnosed With   
  
 Codes Comments Chronic fatigue    -  Primary ICD-10-CM: R53.82 
ICD-9-CM: 780.79 Vitamin D deficiency     ICD-10-CM: E55.9 ICD-9-CM: 268.9 Sleep trouble     ICD-10-CM: G47.9 ICD-9-CM: 780.50 Vitals BP Pulse Temp Resp Height(growth percentile) Weight(growth percentile) 126/72 (BP 1 Location: Left arm, BP Patient Position: Sitting) (!) 101 98 °F (36.7 °C) (Oral) 16 4' 10.25\" (1.48 m) 141 lb (64 kg) SpO2 BMI OB Status Smoking Status 97% 29.22 kg/m2 Having regular periods Never Smoker BMI and BSA Data Body Mass Index Body Surface Area  
 29.22 kg/m 2 1.62 m 2 Preferred Pharmacy Pharmacy Name Phone [phone] [phone] Your Updated Medication List  
  
   
This list is accurate as of 8/13/18  1:57 PM.  Always use your most recent med list.  
  
  
  
  
 ergocalciferol 50,000 unit capsule Commonly known as:  DRISDOL Take 1 Cap by mouth every seven (7) days. multivitamin tablet Commonly known as:  ONE A DAY Take 1 Tab by mouth daily. Follow-up Instructions Return in about 3 months (around 11/13/2018). Patient Instructions Fatigue: Care Instructions Your Care Instructions Fatigue is a feeling of tiredness, exhaustion, or lack of energy. You may feel fatigue because of too much or not enough activity. It can also come from stress, lack of sleep, boredom, and poor diet. Many medical problems, such as viral infections, can cause fatigue. Emotional problems, especially depression, are often the cause of fatigue. Fatigue is most often a symptom of another problem. Treatment for fatigue depends on the cause. For example, if you have fatigue because you have a certain health problem, treating this problem also treats your fatigue. If depression or anxiety is the cause, treatment may help. Follow-up care is a key part of your treatment and safety. Be sure to make and go to all appointments, and call your doctor if you are having problems. It's also a good idea to know your test results and keep a list of the medicines you take. How can you care for yourself at home? · Get regular exercise. But don't overdo it. Go back and forth between rest and exercise. · Get plenty of rest. 
· Eat a healthy diet.  Do not skip meals, especially breakfast. 
 · Reduce your use of caffeine, tobacco, and alcohol. Caffeine is most often found in coffee, tea, cola drinks, and chocolate. · Limit medicines that can cause fatigue. This includes tranquilizers and cold and allergy medicines. When should you call for help? Watch closely for changes in your health, and be sure to contact your doctor if: 
  · You have new symptoms such as fever or a rash.  
  · Your fatigue gets worse.  
  · You have been feeling down, depressed, or hopeless. Or you may have lost interest in things that you usually enjoy.  
  · You are not getting better as expected. Where can you learn more? Go to http://mishaXP Investimentosblue.info/. Enter U664 in the search box to learn more about \"Fatigue: Care Instructions. \" Current as of: November 20, 2017 Content Version: 11.7 © 2508-3642 [employer]. Care instructions adapted under license by Tvoop (which disclaims liability or warranty for this information). If you have questions about a medical condition or this instruction, always ask your healthcare professional. [address] any warranty or liability for your use of this information. Learning About Vitamin D Why is it important to get enough vitamin D? Your body needs vitamin D to absorb calcium. Calcium keeps your bones and muscles, including your heart, healthy and strong. If your muscles don't get enough calcium, they can cramp, hurt, or feel weak. You may have long-term (chronic) muscle aches and pains. If you don't get enough vitamin D throughout life, you have an increased chance of having thin and brittle bones (osteoporosis) in your later years. Children who don't get enough vitamin D may not grow as much as others their age. They also have a chance of getting a rare disease called rickets. It causes weak bones. Vitamin D and calcium are added to many foods.  And your body uses sunshine to make its own vitamin D. 
 How much vitamin D do you need? The [place] of [employer] recommends that people ages 3 through 79 get 600 IU (international units) every day. Adults 71 and older need 800 IU every day. Blood tests for vitamin D can check your vitamin D level. But there is no standard normal range used by all laboratories. The [place] of [employer] recommends a blood level of 20 ng/mL of vitamin D for healthy bones. And most people in the [place] and [employer] [facility] meet this goal. 
How can you get more vitamin D? Foods that contain vitamin D include: 
· [place], tuna, and mackerel. These are some of the best foods to eat when you need to get more vitamin D. 
· Cheese, egg yolks, and beef liver. These foods have vitamin D in small amounts. · Milk, soy drinks, orange juice, yogurt, margarine, and some kinds of cereal have vitamin D added to them. Some people don't make vitamin D as well as others. They may have to take extra care in getting enough vitamin D. Things that reduce how much vitamin D your body makes include: · Dark skin, such as many  Americans have. · Age, especially if you are older than 72. · Digestive problems, such as Crohn's or celiac disease. · Liver and kidney disease. Some people who do not get enough vitamin D may need supplements. Are there any risks from taking vitamin D? 
· Too much vitamin D: 
¨ Can damage your kidneys. ¨ Can cause nausea and vomiting, constipation, and weakness. ¨ Raises the amount of calcium in your blood. If this happens, you can get confused or have an irregular heart rhythm. · Vitamin D may interact with other medicines. Tell your doctor about all of the medicines you take, including over-the-counter drugs, herbs, and pills. Tell your doctor about all of your current medical problems. Where can you learn more? Go to http://misha-blue.info/. Enter [id number] in the search box to learn more about \"Learning About Vitamin D.\" Current as of: May 12, 2017 Content Version: 11.7 © 2518-2589 [address]. Care instructions adapted under license by Card[employer] Guard (which disclaims liability or warranty for this information). If you have questions about a medical condition or this instruction, always ask your healthcare professional. [address] any warranty or liability for your use of this information. Learning About Sleeping Well What does sleeping well mean? Sleeping well means getting enough sleep. How much sleep is enough varies among people. The number of hours you sleep is not as important as how you feel when you wake up. If you do not feel refreshed, you probably need more sleep. Another sign of not getting enough sleep is feeling tired during the day. The average total nightly sleep time is 7½ to 8 hours. Healthy adults may need a little more or a little less than this. Why is getting enough sleep important? Getting enough quality sleep is a basic part of good health. When your sleep suffers, your mood and your thoughts can suffer too. You may find yourself feeling more grumpy or stressed. Not getting enough sleep also can lead to serious problems, including injury, accidents, anxiety, and depression. What might cause poor sleeping? Many things can cause sleep problems, including: · Stress. Stress can be caused by fear about a single event, such as giving a speech. Or you may have ongoing stress, such as worry about work or school. · Depression, anxiety, and other mental or emotional conditions. · Changes in your sleep habits or surroundings. This includes changes that happen where you sleep, such as noise, light, or sleeping in a different bed. It also includes changes in your sleep pattern, such as having jet lag or working a late shift. · Health problems, such as pain, breathing problems, and restless legs syndrome. · Lack of regular exercise. How can you help yourself? Here are some tips that may help you sleep more soundly and wake up feeling more refreshed. Your sleeping area · Use your bedroom only for sleeping and sex. A bit of light reading may help you fall asleep. But if it doesn't, do your reading elsewhere in the house. Don't watch TV in bed. · Be sure your bed is big enough to stretch out comfortably, especially if you have a sleep partner. · Keep your bedroom quiet, dark, and cool. Use curtains, blinds, or a sleep mask to block out light. To block out noise, use earplugs, soothing music, or a \"white noise\" machine. Your evening and bedtime routine · Create a relaxing bedtime routine. You might want to take a warm shower or bath, listen to soothing music, or drink a cup of noncaffeinated tea. · Go to bed at the same time every night. And get up at the same time every morning, even if you feel tired. What to avoid · Limit caffeine (coffee, tea, caffeinated sodas) during the day, and don't have any for at least 4 to 6 hours before bedtime. · Don't drink alcohol before bedtime. Alcohol can cause you to wake up more often during the night. · Don't smoke or use tobacco, especially in the evening. Nicotine can keep you awake. · Don't take naps during the day, especially close to bedtime. · Don't lie in bed awake for too long. If you can't fall asleep, or if you wake up in the middle of the night and can't get back to sleep within 15 minutes or so, get out of bed and go to another room until you feel sleepy. · Don't take medicine right before bed that may keep you awake or make you feel hyper or energized. Your doctor can tell you if your medicine may do this and if you can take it earlier in the day. If you can't sleep · Imagine yourself in a peaceful, pleasant scene. Focus on the details and feelings of being in a place that is relaxing. · Get up and do a quiet or boring activity until you feel sleepy. · Don't drink any liquids after 6 p.m. if you wake up often because you have to go to the bathroom. Where can you learn more? Go to http://misha-blue.info/. Enter D358 in the search box to learn more about \"Learning About Sleeping Well. \" Current as of: December 7, 2017 Content Version: 11.7 © 3048-9830 Saiguo. Care instructions adapted under license by [employer] Sciences (which disclaims liability or warranty for this information). If you have questions about a medical condition or this instruction, always ask your healthcare professional. [address] any warranty or liability for your use of this information. Learning About Sleeping Well What does sleeping well mean? Sleeping well means getting enough sleep. How much sleep is enough varies among people. The number of hours you sleep is not as important as how you feel when you wake up. If you do not feel refreshed, you probably need more sleep. Another sign of not getting enough sleep is feeling tired during the day. The average total nightly sleep time is 7½ to 8 hours. Healthy adults may need a little more or a little less than this. Why is getting enough sleep important? Getting enough quality sleep is a basic part of good health. When your sleep suffers, your mood and your thoughts can suffer too. You may find yourself feeling more grumpy or stressed. Not getting enough sleep also can lead to serious problems, including injury, accidents, anxiety, and depression. What might cause poor sleeping? Many things can cause sleep problems, including: · Stress. Stress can be caused by fear about a single event, such as giving a speech. Or you may have ongoing stress, such as worry about work or school. · Depression, anxiety, and other mental or emotional conditions. · Changes in your sleep habits or surroundings.  This includes changes that happen where you sleep, such as noise, light, or sleeping in a different bed. It also includes changes in your sleep pattern, such as having jet lag or working a late shift. · Health problems, such as pain, breathing problems, and restless legs syndrome. · Lack of regular exercise. How can you help yourself? Here are some tips that may help you sleep more soundly and wake up feeling more refreshed. Your sleeping area · Use your bedroom only for sleeping and sex. A bit of light reading may help you fall asleep. But if it doesn't, do your reading elsewhere in the house. Don't watch TV in bed. · Be sure your bed is big enough to stretch out comfortably, especially if you have a sleep partner. · Keep your bedroom quiet, dark, and cool. Use curtains, blinds, or a sleep mask to block out light. To block out noise, use earplugs, soothing music, or a \"white noise\" machine. Your evening and bedtime routine · Create a relaxing bedtime routine. You might want to take a warm shower or bath, listen to soothing music, or drink a cup of noncaffeinated tea. · Go to bed at the same time every night. And get up at the same time every morning, even if you feel tired. What to avoid · Limit caffeine (coffee, tea, caffeinated sodas) during the day, and don't have any for at least 4 to 6 hours before bedtime. · Don't drink alcohol before bedtime. Alcohol can cause you to wake up more often during the night. · Don't smoke or use tobacco, especially in the evening. Nicotine can keep you awake. · Don't take naps during the day, especially close to bedtime. · Don't lie in bed awake for too long. If you can't fall asleep, or if you wake up in the middle of the night and can't get back to sleep within 15 minutes or so, get out of bed and go to another room until you feel sleepy. · Don't take medicine right before bed that may keep you awake or make you feel hyper or energized.  Your doctor can tell you if your medicine may do this and if you can take it earlier in the day. If you can't sleep · Imagine yourself in a peaceful, pleasant scene. Focus on the details and feelings of being in a place that is relaxing. · Get up and do a quiet or boring activity until you feel sleepy. · Don't drink any liquids after 6 p.m. if you wake up often because you have to go to the bathroom. Where can you learn more? Go to http://misha-blue.info/. Enter L263 in the search box to learn more about \"Learning About Sleeping Well. \" Current as of: December 7, 2017 Content Version: 11.7 © 8890-5465 Cardio control. Care instructions adapted under license by Horsealot (which disclaims liability or warranty for this information). If you have questions about a medical condition or this instruction, always ask your healthcare professional. [place] [address] any warranty or liability for your use of this information. Knee: Exercises Your Care Instructions Here are some examples of exercises for your knee. Start each exercise slowly. Ease off the exercise if you start to have pain. Your doctor or physical therapist will tell you when you can start these exercises and which ones will work best for you. How to do the exercises [facility] [employer] 1. Sit with your leg straight and supported on the floor or a firm bed. (If you feel discomfort in the front or back of your knee, place a small towel roll under your knee.) 2. Tighten the muscles on top of your thigh by pressing the back of your knee flat down to the floor. (If you feel discomfort under your kneecap, place a small towel roll under your knee.) 3. Hold for about 6 seconds, then rest for up to 10 seconds. 4. Do 8 to 12 repetitions several times a day. Straight-leg raises to the front 1. Lie on your back with your good knee bent so that your foot rests flat on the floor. Your injured leg should be straight.  Make sure that your low back has a normal curve. You should be able to slip your flat hand in between the floor and the small of your back, with your palm touching the floor and your back touching the back of your hand. 2. Tighten the thigh muscles in the injured leg by pressing the back of your knee flat down to the floor. Hold your knee straight. 3. Keeping the thigh muscles tight, lift your injured leg up so that your heel is about 12 inches off the floor. Hold for about 6 seconds and then lower slowly. 4. Do 8 to 12 repetitions, 3 times a day. Straight-leg raises to the outside 1. Lie on your side, with your injured leg on top. 2. Tighten the front thigh muscles of your injured leg to keep your knee straight. 3. Keep your hip and your leg straight in line with the rest of your body, and keep your knee pointing forward. Do not drop your hip back. 4. Lift your injured leg straight up toward the ceiling, about 12 inches off the floor. Hold for about 6 seconds, then slowly lower your leg. 5. Do 8 to 12 repetitions. Straight-leg raises to the back 1. Lie on your stomach, and lift your leg straight up behind you (toward the ceiling). 2. Lift your toes about 6 inches off the floor, hold for about 6 seconds, then lower slowly. 3. Do 8 to 12 repetitions. Straight-leg raises to the inside 1. Lie on the side of your body with the injured leg. 2. You can either prop your other (good) leg up on a chair, or you can bend your good knee and put that foot in front of your injured knee. Do not drop your hip back. 3. Tighten the muscles on the front of your thigh to straighten your injured knee. 4. Keep your kneecap pointing forward, and lift your whole leg up toward the ceiling about 6 inches. Hold for about 6 seconds, then lower slowly. 5. Do 8 to 12 repetitions. Heel dig bridging 1.  Lie on your back with both knees bent and your ankles bent so that only your heels are digging into the floor. Your knees should be bent about 90 degrees. 2. Then push your heels into the floor, squeeze your buttocks, and lift your hips off the floor until your shoulders, hips, and knees are all in a straight line. 3. Hold for about 6 seconds as you continue to breathe normally, and then slowly lower your hips back down to the floor and rest for up to 10 seconds. 4. Do 8 to 12 repetitions. Hamstring curls 1. Lie on your stomach with your knees straight. If your kneecap is uncomfortable, roll up a washcloth and put it under your leg just above your kneecap. 2. Lift the foot of your injured leg by bending the knee so that you bring the foot up toward your buttock. If this motion hurts, try it without bending your knee quite as far. This may help you avoid any painful motion. 3. Slowly lower your leg back to the floor. 4. Do 8 to 12 repetitions. 5. With permission from your doctor or physical therapist, you may also want to add a cuff weight to your ankle (not more than 5 pounds). With weight, you do not have to lift your leg more than 12 inches to get a hamstring workout. Shallow standing knee bends Do this exercise only if you have very little pain; if you have no clicking, locking, or giving way if you have an injured knee; and if it does not hurt while you are doing 8 to 12 repetitions. 1. Stand with your hands lightly resting on a counter or chair in front of you. Put your feet shoulder-width apart. 2. Slowly bend your knees so that you squat down like you are going to sit in a chair. Make sure your knees do not go in front of your toes. 3. Lower yourself about 6 inches. Your heels should remain on the floor at all times. 4. Rise slowly to a standing position. Heel raises 1. Stand with your feet a few inches apart, with your hands lightly resting on a counter or chair in front of you. 2. Slowly raise your heels off the floor while keeping your knees straight. 3. Hold for about 6 seconds, then slowly lower your heels to the floor. 4. Do 8 to 12 repetitions several times during the day. Follow-up care is a key part of your treatment and safety. Be sure to make and go to all appointments, and call your doctor if you are having problems. It's also a good idea to know your test results and keep a list of the medicines you take. Where can you learn more? Go to http://misha-blue.info/. Enter U732 in the search box to learn more about \"Knee: Exercises. \" Current as of: November 29, 2017 Content Version: 11.7 © 2091-5820 [employer]. Care instructions adapted under license by Wishpot (which disclaims liability or warranty for this information). If you have questions about a medical condition or this instruction, always ask your healthcare professional. [place] [address] any warranty or liability for your use of this information. Introducing [facility]! [employer] introduces Motionloft patient portal. Now you can access parts of your medical record, email your doctor's office, and request medication refills online. 1. In your internet browser, go to [email or web address] 2. Click on the First Time User? Click Here link in the Sign In box. You will see the New Member Sign Up page. 3. Enter your Motionloft Access Code exactly as it appears below. You will not need to use this code after youve completed the sign-up process. If you do not sign up before the expiration date, you must request a new code. · Motionloft Access Code: [email or web address] Expires: 11/11/2018  1:26 PM 
 
4. Enter the last four digits of your Social Security Number (xxxx) and Date of Birth (mm/dd/yyyy) as indicated and click Submit. You will be taken to the next sign-up page. 5. Create a TwoChopt ID.  This will be your Motionloft login ID and cannot be changed, so think of one that is secure and easy to remember. 6. Create a APS password. You can change your password at any time. 7. Enter your Password Reset Question and Answer. This can be used at a later time if you forget your password. 8. Enter your e-mail address. You will receive e-mail notification when new information is available in [address] 9. Click Sign Up. You can now view and download portions of your medical record. 10. Click the Download Summary menu link to download a portable copy of your medical information. If you have questions, please visit the Frequently Asked Questions section of the APS website. Remember, APS is NOT to be used for urgent needs. For medical emergencies, dial 911. Now available from your iPhone and Android! Please provide this summary of care documentation to your next provider. Your primary care clinician is listed as Bulmaro Willett. If you have any questions after today's visit, please call [phone].

## 2018-08-13 NOTE — PROGRESS NOTES
Chief Complaint   Patient presents with    Fatigue    Sleep Problem    Results     Patient states the first dose of Vit D 50,000 Units  Made her feel really hyper and the second still there but not so intense. Also having left leg achyness has been using  Brace.

## 2018-08-13 NOTE — PROGRESS NOTES
HISTORY OF PRESENT ILLNESS  Caitlyn Fernández is a 23 y.o. female. HPI: here for follow up on fatigue. during work up noted vitamin D deficiency and she started supplement. Some improvement in fatigue and energy. No concern. On supplement/ drisdol. Also had some concern with sleep. 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 Working on sleep hygiene. Denies any headache, dizziness, no chest pain or trouble breathing, no arm or leg weakness. No nausea or vomiting, no weight or appetite changes, no depression or anxiety. No urine or bowel complains, no palpitation, no diaphoresis. Visit Vitals    /72 (BP 1 Location: Left arm, BP Patient Position: Sitting)    Pulse (!) 101    Temp 98 °F (36.7 °C) (Oral)    Resp 16    Ht 4' 10.25\" (1.48 m)    Wt 141 lb (64 kg)    SpO2 97%    BMI 29.22 kg/m2     Review labs. Lab Results   Component Value Date/Time    WBC 7.5 08/03/2018 08:00 AM    HGB 13.8 08/03/2018 08:00 AM    HCT 40.2 08/03/2018 08:00 AM    PLATELET 253 22/15/9666 08:00 AM    MCV 93.7 08/03/2018 08:00 AM     Lab Results   Component Value Date/Time    Sodium 141 08/03/2018 08:00 AM    Potassium 4.0 08/03/2018 08:00 AM    Chloride 106 08/03/2018 08:00 AM    CO2 28 08/03/2018 08:00 AM    Anion gap 7 08/03/2018 08:00 AM    Glucose 91 08/03/2018 08:00 AM    BUN 9 08/03/2018 08:00 AM    Creatinine 0.65 08/03/2018 08:00 AM    BUN/Creatinine ratio 14 08/03/2018 08:00 AM    GFR est AA >60 08/03/2018 08:00 AM    GFR est non-AA >60 08/03/2018 08:00 AM    Calcium 8.8 08/03/2018 08:00 AM    Bilirubin, total 0.4 08/03/2018 08:00 AM    AST (SGOT) 12 (L) 08/03/2018 08:00 AM    Alk.  phosphatase 56 08/03/2018 08:00 AM    Protein, total 7.5 08/03/2018 08:00 AM    Albumin 3.9 08/03/2018 08:00 AM    Globulin 3.6 08/03/2018 08:00 AM    A-G Ratio 1.1 08/03/2018 08:00 AM    ALT (SGPT) 24 08/03/2018 08:00 AM     Lab Results   Component Value Date/Time    Cholesterol, total 141 08/03/2018 08:00 AM    HDL Cholesterol 49 08/03/2018 08:00 AM    LDL, calculated 79.2 08/03/2018 08:00 AM    VLDL, calculated 12.8 08/03/2018 08:00 AM    Triglyceride 64 08/03/2018 08:00 AM    CHOL/HDL Ratio 2.9 08/03/2018 08:00 AM     Lab Results   Component Value Date/Time    TSH 1.36 08/03/2018 08:00 AM     Lab Results   Component Value Date/Time    Vitamin D 25-Hydroxy 15.1 (L) 08/03/2018 08:00 AM       ROS: see HPI     Physical Exam   Constitutional: She is oriented to person, place, and time. No distress. Neck: No thyromegaly present. Cardiovascular: Normal rate, regular rhythm and normal heart sounds. Pulmonary/Chest:   CTA   Abdominal: Soft. Bowel sounds are normal. There is no tenderness. Musculoskeletal: She exhibits no edema. Lymphadenopathy:     She has no cervical adenopathy. Neurological: She is oriented to person, place, and time. Psychiatric: Her behavior is normal.       ASSESSMENT and PLAN    ICD-10-CM ICD-9-CM    1. Chronic fatigue: some improvement with vitamin d supplement. Will observe. Partly could be from sleep disturbance from night shift work. R53.82 780.79    2. Vitamin D deficiency: on supplement. Repeat labs before next visit. Reprint of lab orders given. E55.9 268.9    3. Sleep trouble: due to night shift work. Discussed sleep hygiene. Will observe and f/u next visit. G47.9 780.50    Having on and off discomfort over rt knee. Last visit done x-ray showed no acute findings. Conservative treatment and given hand out on home exercise. Follow-up Disposition:  Return in about 3 months (around 11/13/2018).

## 2018-11-13 ENCOUNTER — OFFICE VISIT (OUTPATIENT)
Dept: FAMILY MEDICINE CLINIC | Age: 23
End: 2018-11-13

## 2018-11-13 VITALS
OXYGEN SATURATION: 95 % | HEIGHT: 58 IN | DIASTOLIC BLOOD PRESSURE: 64 MMHG | HEART RATE: 83 BPM | WEIGHT: 141.8 LBS | TEMPERATURE: 98.6 F | BODY MASS INDEX: 29.77 KG/M2 | SYSTOLIC BLOOD PRESSURE: 110 MMHG | RESPIRATION RATE: 16 BRPM

## 2018-11-13 DIAGNOSIS — G47.9 SLEEP TROUBLE: ICD-10-CM

## 2018-11-13 DIAGNOSIS — E55.9 VITAMIN D DEFICIENCY: Primary | ICD-10-CM

## 2018-11-13 RX ORDER — ACETAMINOPHEN 500 MG
TABLET ORAL
COMMUNITY
End: 2019-04-17

## 2018-11-13 RX ORDER — CHOLECALCIFEROL (VITAMIN D3) 125 MCG
2000 CAPSULE ORAL DAILY
COMMUNITY

## 2018-11-13 NOTE — PATIENT INSTRUCTIONS
Learning About Vitamin D Why is it important to get enough vitamin D? Your body needs vitamin D to absorb calcium. Calcium keeps your bones and muscles, including your heart, healthy and strong. If your muscles don't get enough calcium, they can cramp, hurt, or feel weak. You may have long-term (chronic) muscle aches and pains. If you don't get enough vitamin D throughout life, you have an increased chance of having thin and brittle bones (osteoporosis) in your later years. Children who don't get enough vitamin D may not grow as much as others their age. They also have a chance of getting a rare disease called rickets. It causes weak bones. Vitamin D and calcium are added to many foods. And your body uses sunshine to make its own vitamin D. How much vitamin D do you need? The [employer] recommends that people ages 3 through 79 get 600 IU (international units) every day. Adults 71 and older need 800 IU every day. Blood tests for vitamin D can check your vitamin D level. But there is no standard normal range used by all laboratories. You're likely getting enough vitamin D if your levels are in the range of 20 to 50 ng/mL. How can you get more vitamin D? Foods that contain vitamin D include: 
· [place], tuna, and mackerel. These are some of the best foods to eat when you need to get more vitamin D. 
· Cheese, egg yolks, and beef liver. These foods have vitamin D in small amounts. · Milk, soy drinks, orange juice, yogurt, margarine, and some kinds of cereal have vitamin D added to them. Some people don't make vitamin D as well as others. They may have to take extra care in getting enough vitamin D. Things that reduce how much vitamin D your body makes include: · Dark skin, such as many  Americans have. · Age, especially if you are older than 72. · Digestive problems, such as Crohn's or celiac disease. · Liver and kidney disease. Some people who do not get enough vitamin D may need supplements. Are there any risks from taking vitamin D? 
· Too much vitamin D: 
? Can damage your kidneys. ? Can cause nausea and vomiting, constipation, and weakness. ? Raises the amount of calcium in your blood. If this happens, you can get confused or have an irregular heart rhythm. · Vitamin D may interact with other medicines. Tell your doctor about all of the medicines you take, including over-the-counter drugs, herbs, and pills. Tell your doctor about all of your current medical problems. Where can you learn more? Go to http://mishaeBOOK Initiative Japanblue.info/. Enter [id number] in the search box to learn more about \"Learning About Vitamin D.\" 
Current as of: March 29, 2018 Content Version: 11.8 © 2455-2654 [employer]. Care instructions adapted under license by [employer] (which disclaims liability or warranty for this information). If you have questions about a medical condition or this instruction, always ask your healthcare professional. [place] [address] any warranty or liability for your use of this information. Eating Healthy Foods: Care Instructions Your Care Instructions Eating healthy foods can help lower your risk for disease. Healthy food gives you energy and keeps your heart strong, your brain active, your muscles working, and your bones strong. A healthy diet includes a variety of foods from the basic food groups: grains, vegetables, fruits, milk and milk products, and meat and beans. Some people may eat more of their favorite foods from only one food group and, as a result, miss getting the nutrients they need. So, it is important to pay attention not only to what you eat but also to what you are missing from your diet. You can eat a healthy, balanced diet by making a few small changes. Follow-up care is a key part of your treatment and safety.  Be sure to make and go to all appointments, and call your doctor if you are having problems. It's also a good idea to know your test results and keep a list of the medicines you take. How can you care for yourself at home? Look at what you eat · Keep a food diary for a week or two and record everything you eat or drink. Track the number of servings you eat from each food group. · For a balanced diet every day, eat a variety of: 
? 6 or more ounce-equivalents of grains, such as cereals, breads, crackers, rice, or pasta, every day. An ounce-equivalent is 1 slice of bread, 1 cup of ready-to-eat cereal, or ½ cup of cooked rice, cooked pasta, or cooked cereal. 
? 2½ cups of vegetables, especially: § Dark-green vegetables such as broccoli and spinach. § Orange vegetables such as carrots and sweet potatoes. § Dry beans (such as fraire and kidney beans) and peas (such as lentils). ? 2 cups of fresh, frozen, or canned fruit. A small apple or 1 banana or orange equals 1 cup. ? 3 cups of nonfat or low-fat milk, yogurt, or other milk products. ? 5½ ounces of meat and beans, such as chicken, fish, lean meat, beans, nuts, and seeds. One egg, 1 tablespoon of peanut butter, ½ ounce nuts or seeds, or ¼ cup of cooked beans equals 1 ounce of meat. · Learn how to read food labels for serving sizes and ingredients. Fast-food and convenience-food meals often contain few or no fruits or vegetables. Make sure you eat some fruits and vegetables to make the meal more nutritious. · Look at your food diary. For each food group, add up what you have eaten and then divide the total by the number of days. This will give you an idea of how much you are eating from each food group. See if you can find some ways to change your diet to make it more healthy. Start small · Do not try to make dramatic changes to your diet all at once. You might feel that you are missing out on your favorite foods and then be more likely to fail. · Start slowly, and gradually change your habits. Try some of the following: ? Use whole wheat bread instead of white bread. ? Use nonfat or low-fat milk instead of whole milk. ? Eat brown rice instead of white rice, and eat whole wheat pasta instead of white-flour pasta. ? Try low-fat cheeses and low-fat yogurt. ? Add more fruits and vegetables to meals and have them for snacks. ? Add lettuce, tomato, cucumber, and onion to sandwiches. ? Add fruit to yogurt and cereal. 
Enjoy food · You can still eat your favorite foods. You just may need to eat less of them. If your favorite foods are high in fat, salt, and sugar, limit how often you eat them, but do not cut them out entirely. · Eat a wide variety of foods. Make healthy choices when eating out · The type of restaurant you choose can help you make healthy choices. Even fast-food chains are now offering more low-fat or healthier choices on the menu. · Choose smaller portions, or take half of your meal home. · When eating out, try: ? A veggie pizza with a whole wheat crust or grilled chicken (instead of sausage or pepperoni). ? Pasta with roasted vegetables, grilled chicken, or marinara sauce instead of cream sauce. ? A vegetable wrap or grilled chicken wrap. ? Broiled or poached food instead of fried or breaded items. Make healthy choices easy · Buy packaged, prewashed, ready-to-eat fresh vegetables and fruits, such as baby carrots, salad mixes, and chopped or shredded broccoli and cauliflower. · Buy packaged, presliced fruits, such as melon or pineapple. · Choose 100% fruit or vegetable juice instead of soda. Limit juice intake to 4 to 6 oz (½ to ¾ cup) a day. · Blend low-fat yogurt, fruit juice, and canned or frozen fruit to make a smoothie for breakfast or a snack. Where can you learn more? Go to http://misha-blue.info/. Enter T756 in the search box to learn more about \"Eating Healthy Foods: Care Instructions. \" 
 Current as of: March 29, 2018 Content Version: 11.8 © 8210-7071 [employer]. Care instructions adapted under license by [employer] (which disclaims liability or warranty for this information). If you have questions about a medical condition or this instruction, always ask your healthcare professional. [address] any warranty or liability for your use of this information. Learning About Physical Activity What is physical activity? Physical activity is any kind of activity that gets your body moving. The types of physical activity that can help you get fit and stay healthy include: · Aerobic or \"cardio\" activities that make your heart beat faster and make you breathe harder, such as brisk walking, riding a bike, or running. Aerobic activities strengthen your heart and lungs and build up your endurance. · Strength training activities that make your muscles work against, or \"resist,\" something, such as lifting weights or doing push-ups. These activities help tone and strengthen your muscles. · Stretches that allow you to move your joints and muscles through their full range of motion. Stretching helps you be more flexible and avoid injury. What are the benefits of physical activity? Being active is one of the best things you can do to get fit and stay healthy. It helps you to: · Feel stronger and have more energy to do all the things you like to do. · Focus better at school or work and perform better in sports. · Feel, think, and sleep better. · Reach and stay at a healthy weight. · Lose fat and build lean muscle. · Lower your risk for serious health problems. · Keep your bones, muscles, and joints strong. Being fit lets you do more physical activity. And it lets you work out harder without as much effort. How can you make physical activity part of your life? Get at least 30 minutes of exercise on most days of the week.  Walking is a good choice. You also may want to do other activities, such as running, swimming, cycling, or playing tennis or team sports. Pick activities that you likeones that make your heart beat faster, your muscles stronger, and your muscles and joints more flexible. If you find more than one thing you like doing, do them all. You don't have to do the same thing every day. Get your heart pumping every day. Any activity that makes your heart beat faster and keeps it at that rate for a while counts. Here are some great ways to get your heart beating faster: · Go for a brisk walk, run, or bike ride. · Go for a hike or swim. · Go in-line skating. · Play a game of touch football, basketball, or soccer. · Ride a bike. · Play tennis or racquetball. · Climb stairs. Even some household chores can be aerobicjust do them at a faster pace. Vacuuming, raking or mowing the lawn, sweeping the garage, and washing and waxing the car all can help get your heart rate up. Strengthen your muscles during the week. You don't have to lift heavy weights or grow big, bulky muscles to get stronger. Doing a few simple activities that make your muscles work against, or \"resist,\" something can help you get stronger. For example, you can: · Do push-ups or sit-ups, which use your own body weight as resistance. · Lift weights or dumbbells or use stretch bands at home or in a gym or community center. Stretch your muscles often. Stretching will help you as you become more active. It can help you stay flexible, loosen tight muscles, and avoid injury. It can also help improve your balance and posture and can be a great way to relax. Be sure to stretch the muscles you'll be using when you work out. It's best to warm your muscles slightly before you stretch them. Walk or do some other light aerobic activity for a few minutes, and then start stretching. When you stretch your muscles: · Do it slowly. Stretching is not about going fast or making sudden movements. · Don't push or bounce during a stretch. · Hold each stretch for at least 15 to 30 seconds, if you can. You should feel a stretch in the muscle, but not pain. · Breathe out as you do the stretch. Then breathe in as you hold the stretch. Don't hold your breath. If you're worried about how more activity might affect your health, have a checkup before you start. Follow any special advice your doctor gives you for getting a smart start. Where can you learn more? Go to http://mishaFacioblue.info/. Enter D930 in the search box to learn more about \"Learning About Physical Activity. \" Current as of: December 7, 2017 Content Version: 11.8 © 4649-8468 [employer]. Care instructions adapted under license by [employer] (which disclaims liability or warranty for this information). If you have questions about a medical condition or this instruction, always ask your healthcare professional. [address] any warranty or liability for your use of this information. Learning About Sleeping Well What does sleeping well mean? Sleeping well means getting enough sleep. How much sleep is enough varies among people. The number of hours you sleep is not as important as how you feel when you wake up. If you do not feel refreshed, you probably need more sleep. Another sign of not getting enough sleep is feeling tired during the day. The average total nightly sleep time is 7½ to 8 hours. Healthy adults may need a little more or a little less than this. Why is getting enough sleep important? Getting enough quality sleep is a basic part of good health. When your sleep suffers, your mood and your thoughts can suffer too. You may find yourself feeling more grumpy or stressed.  Not getting enough sleep also can lead to serious problems, including injury, accidents, anxiety, and depression. What might cause poor sleeping? Many things can cause sleep problems, including: · Stress. Stress can be caused by fear about a single event, such as giving a speech. Or you may have ongoing stress, such as worry about work or school. · Depression, anxiety, and other mental or emotional conditions. · Changes in your sleep habits or surroundings. This includes changes that happen where you sleep, such as noise, light, or sleeping in a different bed. It also includes changes in your sleep pattern, such as having jet lag or working a late shift. · Health problems, such as pain, breathing problems, and restless legs syndrome. · Lack of regular exercise. How can you help yourself? Here are some tips that may help you sleep more soundly and wake up feeling more refreshed. Your sleeping area · Use your bedroom only for sleeping and sex. A bit of light reading may help you fall asleep. But if it doesn't, do your reading elsewhere in the house. Don't watch TV in bed. · Be sure your bed is big enough to stretch out comfortably, especially if you have a sleep partner. · Keep your bedroom quiet, dark, and cool. Use curtains, blinds, or a sleep mask to block out light. To block out noise, use earplugs, soothing music, or a \"white noise\" machine. Your evening and bedtime routine · Create a relaxing bedtime routine. You might want to take a warm shower or bath, listen to soothing music, or drink a cup of noncaffeinated tea. · Go to bed at the same time every night. And get up at the same time every morning, even if you feel tired. What to avoid · Limit caffeine (coffee, tea, caffeinated sodas) during the day, and don't have any for at least 4 to 6 hours before bedtime. · Don't drink alcohol before bedtime. Alcohol can cause you to wake up more often during the night. · Don't smoke or use tobacco, especially in the evening. Nicotine can keep you awake. · Don't take naps during the day, especially close to bedtime. · Don't lie in bed awake for too long. If you can't fall asleep, or if you wake up in the middle of the night and can't get back to sleep within 15 minutes or so, get out of bed and go to another room until you feel sleepy. · Don't take medicine right before bed that may keep you awake or make you feel hyper or energized. Your doctor can tell you if your medicine may do this and if you can take it earlier in the day. If you can't sleep · Imagine yourself in a peaceful, pleasant scene. Focus on the details and feelings of being in a place that is relaxing. · Get up and do a quiet or boring activity until you feel sleepy. · Don't drink any liquids after 6 p.m. if you wake up often because you have to go to the bathroom. Where can you learn more? Go to http://misha-blue.info/. Enter O017 in the search box to learn more about \"Learning About Sleeping Well. \" Current as of: December 7, 2017 Content Version: 11.8 © 3859-5200 [employer]. Care instructions adapted under license by [employer] (which disclaims liability or warranty for this information). If you have questions about a medical condition or this instruction, always ask your healthcare professional. [place] [address] any warranty or liability for your use of this information.

## 2018-11-13 NOTE — PROGRESS NOTES
HISTORY OF PRESENT ILLNESS Clarence Heart is a 23 y.o. female. HPI: Here for routine follow up. H/o vitamin D deficiency. She has completed drisdol and just recently started otc supplement. 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 Now pending repeat  Vitamin D level and she will do it today and if not some other day. Denies any other specific concern. Discussed high BMI. Discussed importance of diet modification, exercise and weight loss. She has started working on diet modification and exercise whenever her schedule permits. Understands the importance of life style modification. Visit Vitals /64 (BP 1 Location: Left arm, BP Patient Position: Sitting) Pulse 83 Temp 98.6 °F (37 °C) (Oral) Resp 16 Ht 4' 10.25\" (1.48 m) Wt 141 lb 12.8 oz (64.3 kg) SpO2 95% BMI 29.38 kg/m² Review medication list, vitals, problem list,allergies. Review labs. Lab Results Component Value Date/Time WBC 7.5 08/03/2018 08:00 AM  
 HGB 13.8 08/03/2018 08:00 AM  
 HCT 40.2 08/03/2018 08:00 AM  
 PLATELET 380 26/79/1208 08:00 AM  
 MCV 93.7 08/03/2018 08:00 AM  
 
, 
Lab Results Component Value Date/Time Sodium 141 08/03/2018 08:00 AM  
 Potassium 4.0 08/03/2018 08:00 AM  
 Chloride 106 08/03/2018 08:00 AM  
 CO2 28 08/03/2018 08:00 AM  
 Anion gap 7 08/03/2018 08:00 AM  
 Glucose 91 08/03/2018 08:00 AM  
 BUN 9 08/03/2018 08:00 AM  
 Creatinine 0.65 08/03/2018 08:00 AM  
 BUN/Creatinine ratio 14 08/03/2018 08:00 AM  
 GFR est AA >60 08/03/2018 08:00 AM  
 GFR est non-AA >60 08/03/2018 08:00 AM  
 Calcium 8.8 08/03/2018 08:00 AM  
 Bilirubin, total 0.4 08/03/2018 08:00 AM  
 AST (SGOT) 12 (L) 08/03/2018 08:00 AM  
 Alk. phosphatase 56 08/03/2018 08:00 AM  
 Protein, total 7.5 08/03/2018 08:00 AM  
 Albumin 3.9 08/03/2018 08:00 AM  
 Globulin 3.6 08/03/2018 08:00 AM  
 A-G Ratio 1.1 08/03/2018 08:00 AM  
 ALT (SGPT) 24 08/03/2018 08:00 AM  
 
Lab Results Component Value Date/Time Cholesterol, total 141 08/03/2018 08:00 AM  
 HDL Cholesterol 49 08/03/2018 08:00 AM  
 LDL, calculated 79.2 08/03/2018 08:00 AM  
 VLDL, calculated 12.8 08/03/2018 08:00 AM  
 Triglyceride 64 08/03/2018 08:00 AM  
 CHOL/HDL Ratio 2.9 08/03/2018 08:00 AM  
 
Lab Results Component Value Date/Time TSH 1.36 08/03/2018 08:00 AM  
 
Lab Results Component Value Date/Time Hemoglobin A1c 5.4 08/03/2018 08:00 AM  
 
 
No results found for: B12LT, FOL, RBCF Lab Results Component Value Date/Time Vitamin D 25-Hydroxy 15.1 (L) 08/03/2018 08:00 AM  
   
 
ROS: see HPI Physical Exam  
Constitutional: She is oriented to person, place, and time. No distress. Cardiovascular: Normal heart sounds. Pulmonary/Chest: No respiratory distress. She has no wheezes. Abdominal: Soft. There is no tenderness. Musculoskeletal: She exhibits no edema. Neurological: She is oriented to person, place, and time. Psychiatric: Her behavior is normal.  
 
 
ASSESSMENT and PLAN 
  ICD-10-CM ICD-9-CM 1. Vitamin D deficiency: just started otc supplement. Done with drisdol. Reprint on vitamin D order given to repeat the lab. F/u after result. E55.9 268.9 2. BMI 29.0-29.9,adult: Discussed high BMI. Discussed importance of diet modification, exercise and weight loss. She has started working on diet modification and exercise whenever her schedule permits. Understands the importance of life style modification. Z68.29 V85.25   
3. Sleep trouble: due to her shift work schedule probably. For now given sleep hygiene info in AVS. She will work on it. For now observe. G47.9 780.50 Pt understood and agree with the plan Follow-up Disposition: 
Return in about 6 months (around 5/13/2019).

## 2018-11-13 NOTE — PROGRESS NOTES
1. Have you been to the ER, urgent care clinic since your last visit? Hospitalized since your last visit? No 
 
2. Have you seen or consulted any other health care providers outside of the [address] [place] [address] since your last visit? Include any pap smears or colon screening. No 
 
Last flu vaccine - 10/2018 through employer

## 2018-11-20 ENCOUNTER — HOSPITAL ENCOUNTER (OUTPATIENT)
Dept: LAB | Age: 23
Discharge: HOME | End: 2018-11-20
Payer: COMMERCIAL

## 2018-11-20 DIAGNOSIS — E55.9 VITAMIN D DEFICIENCY: ICD-10-CM

## 2018-11-20 LAB — 25(OH)D3 SERPL-MCNC: 52.2 NG/ML (ref 30–100)

## 2018-11-20 PROCEDURE — 82306 VITAMIN D 25 HYDROXY: CPT

## 2018-11-20 PROCEDURE — 36415 COLL VENOUS BLD VENIPUNCTURE: CPT

## 2018-11-27 NOTE — PROGRESS NOTES
Contacted patient and verified identity using name and date of birth (2- identifiers)  Spoke with patient and she verbalized understanding of normal Vit D level.

## 2019-02-12 ENCOUNTER — OFFICE VISIT (OUTPATIENT)
Dept: FAMILY MEDICINE CLINIC | Age: 24
End: 2019-02-12

## 2019-02-12 VITALS
TEMPERATURE: 98.7 F | DIASTOLIC BLOOD PRESSURE: 74 MMHG | HEART RATE: 75 BPM | OXYGEN SATURATION: 99 % | BODY MASS INDEX: 30.39 KG/M2 | WEIGHT: 144.8 LBS | SYSTOLIC BLOOD PRESSURE: 112 MMHG | RESPIRATION RATE: 16 BRPM | HEIGHT: 58 IN

## 2019-02-12 RX ORDER — CEPHALEXIN 500 MG/1
500 CAPSULE ORAL 2 TIMES DAILY
Qty: 14 CAP | Refills: 0 | Status: SHIPPED | OUTPATIENT
Start: 2019-02-12 | End: 2019-02-19

## 2019-02-12 NOTE — PROGRESS NOTES
1. Have you been to the ER, urgent care clinic since your last visit? Hospitalized since your last visit? No    2. Have you seen or consulted any other health care providers outside of the [address] [place] [address] since your last visit? Include any pap smears or colon screening.  No

## 2019-02-12 NOTE — PROGRESS NOTES
HISTORY OF PRESENT ILLNESS  Shilpa Lobo is a 20 y.o. female. HPI: Here with c./o lump under left breast. Noted on Saturday , painful. Got worse over weekend so decided to come to the office. No fever. No redness but feels tender over lump site. No breast pain or lump over breast on self breast exam. Moderate intensity pain localized over lump site. No radiation of pain. No other concern. Vitals stable. Visit Vitals  /74 (BP 1 Location: Left arm, BP Patient Position: Sitting)   Pulse 75   Temp 98.7 °F (37.1 °C) (Oral)   Resp 16   Ht 4' 10.25\" (1.48 m)   Wt 144 lb 12.8 oz (65.7 kg)   SpO2 99%   BMI 30.00 kg/m²     ROS: see HPI     Physical Exam   Constitutional: She is oriented to person, place, and time. No distress. Neurological: She is oriented to person, place, and time. Skin:   Lump almost 1 cm in size, smooth margin, soft in consistency under left breast . Localized tenderness. No redness or swelling over surrounding area. ASSESSMENT and PLAN    ICD-10-CM ICD-9-CM    1. Lump: under left breast : infected cyst. For now giving antibiotic to take with food. F/u next visit. Tylenol or motrin with food as needed for pain. Advised to contact office if no improvement in 48 hours. R22.9 782.2 cephALEXin (KEFLEX) 500 mg capsule   Pt understood and agree with the plan     Follow-up Disposition:  Return in about 1 week (around 2/19/2019).

## 2019-02-12 NOTE — PATIENT INSTRUCTIONS
Skin Abscess: Care Instructions  Your Care Instructions    A skin abscess is a bacterial infection that forms a pocket of pus. A boil is a kind of skin abscess. The doctor may have cut an opening in the abscess so that the pus can drain out. You may have gauze in the cut so that the abscess will stay open and keep draining. You may need antibiotics. You will need to follow up with your doctor to make sure the infection has gone away. The doctor has checked you carefully, but problems can develop later. If you notice any problems or new symptoms, get medical treatment right away. Follow-up care is a key part of your treatment and safety. Be sure to make and go to all appointments, and call your doctor if you are having problems. It's also a good idea to know your test results and keep a list of the medicines you take. How can you care for yourself at home? · Apply warm and dry compresses, a heating pad set on low, or a hot water bottle 3 or 4 times a day for pain. Keep a cloth between the heat source and your skin. · If your doctor prescribed antibiotics, take them as directed. Do not stop taking them just because you feel better. You need to take the full course of antibiotics. · Take pain medicines exactly as directed. ? If the doctor gave you a prescription medicine for pain, take it as prescribed. ? If you are not taking a prescription pain medicine, ask your doctor if you can take an over-the-counter medicine. · Keep your bandage clean and dry. Change the bandage whenever it gets wet or dirty, or at least one time a day. · If the abscess was packed with gauze:  ? Keep follow-up appointments to have the gauze changed or removed. If the doctor instructed you to remove the gauze, follow the instructions you were given for how to remove it. ? After the gauze is removed, soak the area in warm water for 15 to 20 minutes 2 times a day, until the wound closes. When should you call for help?   Call your doctor now or seek immediate medical care if:    · You have signs of worsening infection, such as:  ? Increased pain, swelling, warmth, or redness. ? Red streaks leading from the infected skin. ? Pus draining from the wound. ? A fever.    Watch closely for changes in your health, and be sure to contact your doctor if:    · You do not get better as expected. Where can you learn more? Go to http://misha-blue.info/. Enter X949 in the search box to learn more about \"Skin Abscess: Care Instructions. \"  Current as of: April 17, 2018  Content Version: 11.9  © 7115-6054 [employer]. Care instructions adapted under license by [employer] (which disclaims liability or warranty for this information). If you have questions about a medical condition or this instruction, always ask your healthcare professional. [address] any warranty or liability for your use of this information.

## 2019-02-22 ENCOUNTER — OFFICE VISIT (OUTPATIENT)
Dept: FAMILY MEDICINE CLINIC | Age: 24
End: 2019-02-22

## 2019-02-22 VITALS
SYSTOLIC BLOOD PRESSURE: 118 MMHG | BODY MASS INDEX: 30.94 KG/M2 | HEIGHT: 58 IN | TEMPERATURE: 98.8 F | RESPIRATION RATE: 12 BRPM | HEART RATE: 82 BPM | DIASTOLIC BLOOD PRESSURE: 78 MMHG | WEIGHT: 147.4 LBS | OXYGEN SATURATION: 97 %

## 2019-02-22 DIAGNOSIS — N60.82 SEBACEOUS CYST OF BREAST, LEFT: Primary | ICD-10-CM

## 2019-02-22 NOTE — PROGRESS NOTES
HISTORY OF PRESENT ILLNESS Blaze Goff is a 20 y.o. female. HPI: here for follow up. Last visit had almost 1 cm size painful cyst under left breast area. Was very tender on touch, redness and swelling over affected area. She was given antibitoic and now it has decrase in size and no pain. Redness and swelling also improved. No open skin. She would like to remove it. Sending to surgeon. Denies any side effects of antibiotic. No nausea or vomiting. No abdominal pain . no change in bowel habits. Visit Vitals /78 (BP 1 Location: Left arm, BP Patient Position: Sitting) Pulse 82 Temp 98.8 °F (37.1 °C) (Oral) Resp 12 Ht 4' 10.25\" (1.48 m) Wt 147 lb 6.4 oz (66.9 kg) SpO2 97% BMI 30.54 kg/m² Review medication list, vitals, problem list,allergies. ROS: see HPI Physical Exam  
Constitutional: She is oriented to person, place, and time. No distress. Cardiovascular: Normal rate, regular rhythm and normal heart sounds. Pulmonary/Chest: CTA Abdominal: Soft. Bowel sounds are normal. There is no tenderness. Musculoskeletal: She exhibits no edema. Neurological: She is oriented to person, place, and time. Skin:  
 
  
0.5 cm in size cyst under left breast. Non tender. No redness or swelling. ASSESSMENT and PLAN 
  ICD-10-CM ICD-9-CM 1. Sebaceous cyst of breast, left: was infected. Improved with antibiotic. No side effects. Sending to surgeon as wanted to remove it N60.82 610.8 REFERRAL TO GENERAL SURGERY Pt understood and agree with the plan Follow-up Disposition: 
Return for as scheduled .

## 2019-02-22 NOTE — PATIENT INSTRUCTIONS
Epidermoid Cyst: Care Instructions Your Care Instructions An epidermoid (say \"jj-obg-EYG-anisha\") cyst is a lump just under the skin. These cysts can form when a hair follicle becomes blocked. They are common in acne and may occur on the face, neck, back, and genitals. However, they can form anywhere on the body. These cysts are not cancer and do not lead to cancer. They tend not to hurt, but they can sometimes become swollen and painful. They also may break open (rupture) and cause scarring. These cysts sometimes do not cause problems and may not need treatment. If you have a cyst that is swollen and hurts, your doctor may inject it with a medicine to help it heal. But it is more likely that a painful cyst will need to be removed. Your doctor will give you a shot of numbing medicine and cut into the cyst to drain it or remove it. This makes the symptoms go away. Follow-up care is a key part of your treatment and safety. Be sure to make and go to all appointments, and call your doctor if you are having problems. It's also a good idea to know your test results and keep a list of the medicines you take. How can you care for yourself at home? · Do not squeeze the cyst or poke it with a needle to open it. This can cause swelling, redness, and infection. · Always have a doctor look at any new lumps you get to make sure that they are not serious. When should you call for help? Watch closely for changes in your health, and be sure to contact your doctor if: 
  · You have a fever, redness, or swelling after you get a shot of medicine in the cyst.  
  · You see or feel a new lump on your skin. Where can you learn more? Go to http://misha-blue.info/. Enter B450 in the search box to learn more about \"Epidermoid Cyst: Care Instructions. \" Current as of: April 17, 2018 Content Version: 11.9 © 2391-2995 [employer].  Care instructions adapted under license by [address] (which disclaims liability or warranty for this information). If you have questions about a medical condition or this instruction, always ask your healthcare professional. [address] any warranty or liability for your use of this information.

## 2019-02-22 NOTE — PROGRESS NOTES
Chief Complaint Patient presents with  Follow-up  
  lump under left breast  
 
Patient is not fasting. Patient in room # 6.  
 
 
1. Have you been to the ER, urgent care clinic since your last visit? Hospitalized since your last visit? No 
 
2. Have you seen or consulted any other health care providers outside of the [address] [place] [address] since your last visit? Include any pap smears or colon screening. No 
 
HM Reviewed. Flowsheets, Learning needs, PHQ and abuse completed. 3 most recent PHQ Screens 2/22/2019 Little interest or pleasure in doing things Several days Feeling down, depressed, irritable, or hopeless Several days Total Score PHQ 2 2

## 2019-03-06 ENCOUNTER — OFFICE VISIT (OUTPATIENT)
Dept: SURGERY | Age: 24
End: 2019-03-06

## 2019-03-06 VITALS
BODY MASS INDEX: 30.86 KG/M2 | SYSTOLIC BLOOD PRESSURE: 122 MMHG | HEIGHT: 58 IN | RESPIRATION RATE: 18 BRPM | OXYGEN SATURATION: 98 % | DIASTOLIC BLOOD PRESSURE: 78 MMHG | WEIGHT: 147 LBS | TEMPERATURE: 98.1 F | HEART RATE: 74 BPM

## 2019-03-06 DIAGNOSIS — M79.89 SOFT TISSUE MASS: Primary | ICD-10-CM

## 2019-03-06 DIAGNOSIS — R19.02 ABDOMINAL WALL MASS OF LEFT UPPER QUADRANT: ICD-10-CM

## 2019-03-06 RX ORDER — SODIUM CHLORIDE 0.9 % (FLUSH) 0.9 %
5-40 SYRINGE (ML) INJECTION AS NEEDED
Status: CANCELLED | OUTPATIENT
Start: 2019-03-06

## 2019-03-06 RX ORDER — SODIUM CHLORIDE 0.9 % (FLUSH) 0.9 %
5-40 SYRINGE (ML) INJECTION EVERY 8 HOURS
Status: CANCELLED | OUTPATIENT
Start: 2019-03-06

## 2019-03-06 NOTE — PROGRESS NOTES
Review of Systems   Constitutional: Negative. HENT: Negative. Eyes: Negative. Respiratory: Positive for cough. Negative for hemoptysis, sputum production, shortness of breath and wheezing. Cardiovascular: Negative. Gastrointestinal: Negative. Genitourinary: Negative. Musculoskeletal: Positive for joint pain. Negative for back pain, falls, myalgias and neck pain. Skin: Negative. Neurological: Negative. Endo/Heme/Allergies: Negative. Psychiatric/Behavioral: Negative for depression, hallucinations, memory loss, substance abuse and suicidal ideas. The patient has insomnia. The patient is not nervous/anxious.

## 2019-03-06 NOTE — PATIENT INSTRUCTIONS
Learning About Benign Soft Tissue Tumors  What is a benign soft tissue tumor? A soft tissue tumor is a growth of abnormal cells in the body's soft tissues. These tissues include the muscles, lymph and blood vessels, nerves, and fat. They can also include cartilage and other connective tissues. When a tumor is benign (say \"selina-NYN\"), that means it's not cancer. Most soft tissue tumors are benign. Benign tumors don't spread to other tissues and organs. They usually aren't life-threatening. But they can cause problems if they grow too much, press on nerves, or cause pain. What are some common types of these tumors? Some common types of benign soft tissue tumors include:  Lipomas. These tumors form from fat cells. Angiolipomas are a type of lipoma made up of fat and blood vessels. Nerve sheath tumors. Tumors on a nerve may include schwannomas and neurofibromas. They might need to be removed. Benign synovial tumors. These appear around the tendons, near the knee, hip, elbow, or shoulder. Examples include giant cell tumor of the tendon sheath and synovial chondromatosis. Hemangiomas. These are tumors of the blood vessels. Desmoid tumors. These tumors commonly appear on the shoulder, chest, back, and thighs. Nodular fasciitis. These are most common in the arms. They can grow quickly. Other types of tumors may appear on the skin, belly, arms and legs, organs, and nerves. What are the symptoms? Sometimes a tumor can be felt as a bump under the skin. Or if the tumor is deep enough below the skin, you may not be able to feel it. You may also feel pain near the tumor if it's large or pressing on something. How are these tumors diagnosed? Your doctor will ask you about your symptoms and past health and will examine you. A physical exam can help your doctor diagnose some soft tissue tumors. Your doctor may find a tumor when taking X-rays or other imaging tests for another problem.   If your doctor isn't sure what the growth is and your symptoms could be signs of a tumor, you will get some tests. The tests can help make sure it's not cancer. They can also help your doctor figure out the best treatment for the tumor. · You may have one or more imaging tests to get a better look at the tumor. These may include:  ? X-rays. ? Ultrasound tests. ? CT scan.  ? MRI scan. · You may need blood tests and lab work. · You may need a biopsy so a sample of the tumor can be looked at under a microscope. This sample may also be used to test for biomarkers. They will help with planning treatment. Doctors may also look at other parts of your body for other tumors. How are they treated? Some benign soft tissue tumors that aren't causing problems can be watched over time. Some may remain stable or go away on their own. But if the tumor causes pain, is growing larger, or affects your movement, it may need to be removed. You may also choose to have these tumors removed if they bother you or if you don't like how they look. Doctors may remove some tumors with surgery. In some cases, other treatments, including medicines, may be used. Talk with your doctor or specialist about other types of treatments for the tumor. After your treatment, your doctor may want to check the area again to make sure that the growth doesn't come back. Follow-up care is a key part of your treatment and safety. Be sure to make and go to all appointments, and call your doctor if you are having problems. It's also a good idea to know your test results and keep a list of the medicines you take. Where can you learn more? Go to http://imsha-blue.info/. Enter S210 in the search box to learn more about \"Learning About Benign Soft Tissue Tumors. \"  Current as of: March 27, 2018  Content Version: 11.9  © 4314-0710 [employer].  Care instructions adapted under license by [employer] (which disclaims liability or warranty for this information). If you have questions about a medical condition or this instruction, always ask your healthcare professional. [place] [address] any warranty or liability for your use of this information.

## 2019-03-06 NOTE — PROGRESS NOTES
[facility]  General Surgery    Subjective:      HPI: Patient is a very pleasant obese-BMI 30.46 kg/m square female with a past medical history remarkable for vitamin D deficiency and allergic rhinitis. She is referred by Dr. Jack Gentile for evaluation and management of a mass in the soft tissue of the left upper abdomen. The patient states that approximately 3 weeks ago she noticed a swollen tender mass in the inframammary fold of the left breast.  She was treated with a 7-day course of Keflex. The mass decrease in size and tenderness. Patient states that she has never seen a gynecologist.  She has never had a clinical breast exam.  She performs self breast exam.  She denies any masses in the breast.  She denies any nipple drainage or discharge. She denies any unintentional weight loss. 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 Patient Active Problem List    Diagnosis Date Noted    Vitamin D deficiency 08/03/2018    Cough due to bronchospasm 03/12/2014    AR (allergic rhinitis) 03/12/2014     No past medical history on file. Past Surgical History:   Procedure Laterality Date    HX WISDOM TEETH EXTRACTION Bilateral 2012      Family History   Problem Relation Age of Onset    Hypertension Mother    [facility] Elevated Lipids Mother     Arthritis-osteo Mother     Arthritis-rheumatoid Maternal Grandmother     Stroke Maternal Grandmother     Hypertension Maternal Grandmother     Hypertension Paternal Grandmother     Hypertension Father     Elevated Lipids Father     Arthritis-osteo Father       Social History     Tobacco Use    Smoking status: Never Smoker    Smokeless tobacco: Never Used   Substance Use Topics    Alcohol use: No      No Known Allergies    Prior to Admission medications    Medication Sig Start Date End Date Taking? Authorizing Provider   cholecalciferol, vitamin D3, (VITAMIN D3) 2,000 unit tab Take 2,000 Units by mouth daily. Yes Provider, Historical   acetaminophen (TYLENOL EXTRA STRENGTH) 500 mg tablet Take  by mouth every six (6) hours as needed for Pain. Yes Provider, Historical   multivitamin (ONE A DAY) tablet Take 1 Tab by mouth daily. Yes Provider, Historical       Review of Systems:    14 systems were reviewed. The results are as above in the HPI and otherwise negative. Objective:       Physical Exam:  GENERAL: alert, cooperative, no distress, appears stated age,   EYE: conjunctivae/corneas clear. PERRL, EOM's intact. Fundi benign,  THROAT & NECK: normal and no erythema or exudates noted. ,    LYMPHATIC: Cervical, supraclavicular, and axillary nodes normal. ,   LUNG: clear to auscultation bilaterally,   HEART: regular rate and rhythm, S1, S2 normal, no murmur, click, rub or gallop  BREAST: The breast are symmetrical.  No dimpling, retractions, skin changes or nipple retractions are visible. No axillary of supraclavicular lymphadenopathy bilaterally. No nipple retraction or discharge bilaterally. No breast masses bilaterally. ABDOMEN: soft, non-tender. Bowel sounds normal. No masses,  no organomegaly,  EXTREMITIES:  extremities normal, atraumatic, no cyanosis or edema,   SKIN: Left inframammary fold 1.5 x 1 cm soft tissue mass in the midclavicular line. NEUROLOGIC: AOx3. Gait normal. Reflexes and motor strength normal and symmetric. Cranial nerves 2-12 and sensation grossly intact. ,     Data Review: Mammography     Ms. Danny Roberts has a reminder for a \"due or due soon\" health maintenance. I have asked that she contact her primary care provider for follow-up on this health maintenance. Impression:     · Patient with a soft tissue mass in the inframammary fold of the left breast the midclavicular line.     Plan:     · Excisional biopsy soft tissue mass abdominal wall left upper quadrant  · Consent on chart  · Preoperative orders written    Signed By: Starla Cruz MD     March 6, 2019

## 2019-03-25 ENCOUNTER — HOSPITAL ENCOUNTER (OUTPATIENT)
Dept: LAB | Age: 24
Discharge: HOME | End: 2019-03-25
Payer: COMMERCIAL

## 2019-03-25 ENCOUNTER — HOSPITAL ENCOUNTER (OUTPATIENT)
Dept: GENERAL RADIOLOGY | Age: 24
Discharge: HOME | End: 2019-03-25
Payer: COMMERCIAL

## 2019-03-25 DIAGNOSIS — R19.02 ABDOMINAL WALL MASS OF LEFT UPPER QUADRANT: ICD-10-CM

## 2019-03-25 DIAGNOSIS — M79.89 SOFT TISSUE MASS: ICD-10-CM

## 2019-03-25 LAB
ANION GAP SERPL CALC-SCNC: 5 MMOL/L (ref 3–18)
BASOPHILS # BLD: 0.2 K/UL (ref 0–0.06)
BASOPHILS NFR BLD: 3 % (ref 0–3)
BUN SERPL-MCNC: 13 MG/DL (ref 7–18)
BUN/CREAT SERPL: 22 (ref 12–20)
CALCIUM SERPL-MCNC: 9 MG/DL (ref 8.5–10.1)
CHLORIDE SERPL-SCNC: 108 MMOL/L (ref 100–108)
CO2 SERPL-SCNC: 26 MMOL/L (ref 21–32)
CREAT SERPL-MCNC: 0.58 MG/DL (ref 0.6–1.3)
DIFFERENTIAL METHOD BLD: ABNORMAL
EOSINOPHIL # BLD: 0.3 K/UL (ref 0–0.4)
EOSINOPHIL NFR BLD: 4 % (ref 0–5)
ERYTHROCYTE [DISTWIDTH] IN BLOOD BY AUTOMATED COUNT: 11.7 % (ref 11.6–14.5)
GLUCOSE SERPL-MCNC: 91 MG/DL (ref 74–99)
HCT VFR BLD AUTO: 38.7 % (ref 35–45)
HGB BLD-MCNC: 13.2 G/DL (ref 12–16)
LYMPHOCYTES # BLD: 2.5 K/UL (ref 0.8–3.5)
LYMPHOCYTES NFR BLD: 31 % (ref 20–51)
MCH RBC QN AUTO: 32.1 PG (ref 24–34)
MCHC RBC AUTO-ENTMCNC: 34.1 G/DL (ref 31–37)
MCV RBC AUTO: 94.2 FL (ref 74–97)
MONOCYTES # BLD: 0.2 K/UL (ref 0–1)
MONOCYTES NFR BLD: 3 % (ref 2–9)
NEUTS SEG # BLD: 4.6 K/UL (ref 1.8–8)
NEUTS SEG NFR BLD: 57 % (ref 42–75)
OTHER CELLS NFR BLD MANUAL: 2 %
PLATELET # BLD AUTO: 362 K/UL (ref 135–420)
PLATELET COMMENTS,PCOM: ABNORMAL
PMV BLD AUTO: 10.1 FL (ref 9.2–11.8)
POTASSIUM SERPL-SCNC: 4.1 MMOL/L (ref 3.5–5.5)
RBC # BLD AUTO: 4.11 M/UL (ref 4.2–5.3)
RBC MORPH BLD: ABNORMAL
SODIUM SERPL-SCNC: 139 MMOL/L (ref 136–145)
WBC # BLD AUTO: 8 K/UL (ref 4.6–13.2)

## 2019-03-25 PROCEDURE — 36415 COLL VENOUS BLD VENIPUNCTURE: CPT

## 2019-03-25 PROCEDURE — 71046 X-RAY EXAM CHEST 2 VIEWS: CPT

## 2019-03-25 PROCEDURE — 80048 BASIC METABOLIC PNL TOTAL CA: CPT

## 2019-03-25 PROCEDURE — 85025 COMPLETE CBC W/AUTO DIFF WBC: CPT

## 2019-04-10 ENCOUNTER — ANESTHESIA EVENT (OUTPATIENT)
Dept: SURGERY | Age: 24
End: 2019-04-10
Payer: COMMERCIAL

## 2019-04-10 ENCOUNTER — TELEPHONE (OUTPATIENT)
Dept: SURGERY | Age: 24
End: 2019-04-10

## 2019-04-10 NOTE — TELEPHONE ENCOUNTER
Spoke with patient regarding arrival time for surgery on April 11,2019, be sure to arrive at 630 am, surgery is scheduled for 857am, patient verbally understood.

## 2019-04-10 NOTE — H&P
[facility]  General Surgery       Impression:      · Patient with a soft tissue mass in the inframammary fold of the left breast the midclavicular line.     Plan:      · Excisional biopsy soft tissue mass abdominal wall left upper quadrant  · Consent on chart  · Preoperative orders written    Subjective:      HPI: Patient is a very pleasant obese-BMI 30.46 kg/m square female with a past medical history remarkable for vitamin D deficiency and allergic rhinitis. She is referred by Dr. Janie Sherman for evaluation and management of a mass in the soft tissue of the left upper abdomen. The patient states that approximately 3 weeks ago she noticed a swollen tender mass in the inframammary fold of the left breast.  She was treated with a 7-day course of Keflex. The mass decrease in size and tenderness. Patient states that she has never seen a gynecologist.  She has never had a clinical breast exam.  She performs self breast exam.  She denies any masses in the breast.  She denies any nipple drainage or discharge. She denies any unintentional weight loss. She is employed at [employer] [place] [employer] is [place].          Patient Active Problem List     Diagnosis Date Noted    Vitamin D deficiency 08/03/2018    Cough due to bronchospasm 03/12/2014    AR (allergic rhinitis) 03/12/2014      No past medical history on file.          Past Surgical History:   Procedure Laterality Date    HX WISDOM TEETH EXTRACTION Bilateral 2012            Family History   Problem Relation Age of Onset    Hypertension Mother     [facility] Elevated Lipids Mother      Arthritis-osteo Mother      Arthritis-rheumatoid Maternal Grandmother      Stroke Maternal Grandmother      Hypertension Maternal Grandmother      Hypertension Paternal Grandmother      Hypertension Father      Elevated Lipids Father      Arthritis-osteo Father        Social History           Tobacco Use    Smoking status: Never Smoker    Smokeless tobacco: Never Used   Substance Use Topics    Alcohol use: No      No Known Allergies            Prior to Admission medications    Medication Sig Start Date End Date Taking? Authorizing Provider   cholecalciferol, vitamin D3, (VITAMIN D3) 2,000 unit tab Take 2,000 Units by mouth daily.     Yes Provider, Historical   acetaminophen (TYLENOL EXTRA STRENGTH) 500 mg tablet Take  by mouth every six (6) hours as needed for Pain.     Yes Provider, Historical   multivitamin (ONE A DAY) tablet Take 1 Tab by mouth daily.     Yes Provider, Historical         Review of Systems:    14 systems were reviewed. The results are as above in the HPI and otherwise negative.      Objective:         Physical Exam:  GENERAL: alert, cooperative, no distress, appears stated age,   EYE: conjunctivae/corneas clear. PERRL, EOM's intact. Fundi benign,  THROAT & NECK: normal and no erythema or exudates noted. ,    LYMPHATIC: Cervical, supraclavicular, and axillary nodes normal. ,   LUNG: clear to auscultation bilaterally,   HEART: regular rate and rhythm, S1, S2 normal, no murmur, click, rub or gallop  BREAST: The breast are symmetrical.  No dimpling, retractions, skin changes or nipple retractions are visible. No axillary of supraclavicular lymphadenopathy bilaterally. No nipple retraction or discharge bilaterally. No breast masses bilaterally. ABDOMEN: soft, non-tender. Bowel sounds normal. No masses,  no organomegaly,  EXTREMITIES:  extremities normal, atraumatic, no cyanosis or edema,   SKIN: Left inframammary fold 1.5 x 1 cm soft tissue mass in the midclavicular line. NEUROLOGIC: AOx3. Gait normal. Reflexes and motor strength normal and symmetric. Cranial nerves 2-12 and sensation grossly intact. ,      Data Review: Mammography      · Ms. Pennie Burns has a reminder for a \"due or due soon\" health maintenance.  I have asked that she contact her primary care provider for follow-up on this health maintenance.

## 2019-04-11 ENCOUNTER — HOSPITAL ENCOUNTER (OUTPATIENT)
Age: 24
Setting detail: OUTPATIENT SURGERY
Discharge: HOME | End: 2019-04-11
Attending: SURGERY | Admitting: SURGERY
Payer: COMMERCIAL

## 2019-04-11 ENCOUNTER — ANESTHESIA (OUTPATIENT)
Dept: SURGERY | Age: 24
End: 2019-04-11
Payer: COMMERCIAL

## 2019-04-11 VITALS
WEIGHT: 142 LBS | HEART RATE: 95 BPM | SYSTOLIC BLOOD PRESSURE: 122 MMHG | BODY MASS INDEX: 29.81 KG/M2 | HEIGHT: 58 IN | TEMPERATURE: 97 F | RESPIRATION RATE: 15 BRPM | DIASTOLIC BLOOD PRESSURE: 86 MMHG | OXYGEN SATURATION: 100 %

## 2019-04-11 DIAGNOSIS — G89.18 POSTOPERATIVE PAIN: Primary | ICD-10-CM

## 2019-04-11 LAB — HCG UR QL: NEGATIVE

## 2019-04-11 PROCEDURE — 74011000250 HC RX REV CODE- 250: Performed by: SURGERY

## 2019-04-11 PROCEDURE — 76060000032 HC ANESTHESIA 0.5 TO 1 HR: Performed by: SURGERY

## 2019-04-11 PROCEDURE — 77030020782 HC GWN BAIR PAWS FLX 3M -B: Performed by: SURGERY

## 2019-04-11 PROCEDURE — 88304 TISSUE EXAM BY PATHOLOGIST: CPT

## 2019-04-11 PROCEDURE — 81025 URINE PREGNANCY TEST: CPT

## 2019-04-11 PROCEDURE — 77030002933 HC SUT MCRYL J&J -A: Performed by: SURGERY

## 2019-04-11 PROCEDURE — 74011250636 HC RX REV CODE- 250/636: Performed by: NURSE ANESTHETIST, CERTIFIED REGISTERED

## 2019-04-11 PROCEDURE — 74011250636 HC RX REV CODE- 250/636

## 2019-04-11 PROCEDURE — 88305 TISSUE EXAM BY PATHOLOGIST: CPT

## 2019-04-11 PROCEDURE — 77030039266 HC ADH SKN EXOFIN S2SG -A: Performed by: SURGERY

## 2019-04-11 PROCEDURE — 76210000021 HC REC RM PH II 0.5 TO 1 HR: Performed by: SURGERY

## 2019-04-11 PROCEDURE — 74011000250 HC RX REV CODE- 250

## 2019-04-11 PROCEDURE — 77030031139 HC SUT VCRL2 J&J -A: Performed by: SURGERY

## 2019-04-11 PROCEDURE — 77030003028 HC SUT VCRL J&J -A: Performed by: SURGERY

## 2019-04-11 PROCEDURE — 74011250637 HC RX REV CODE- 250/637: Performed by: NURSE ANESTHETIST, CERTIFIED REGISTERED

## 2019-04-11 PROCEDURE — 76210000016 HC OR PH I REC 1 TO 1.5 HR: Performed by: SURGERY

## 2019-04-11 PROCEDURE — 76010000138 HC OR TIME 0.5 TO 1 HR: Performed by: SURGERY

## 2019-04-11 PROCEDURE — 77030032490 HC SLV COMPR SCD KNE COVD -B: Performed by: SURGERY

## 2019-04-11 PROCEDURE — 77030010509 HC AIRWY LMA MSK TELE -A: Performed by: ANESTHESIOLOGY

## 2019-04-11 PROCEDURE — 74011250636 HC RX REV CODE- 250/636: Performed by: SURGERY

## 2019-04-11 RX ORDER — GLYCOPYRROLATE 0.2 MG/ML
INJECTION INTRAMUSCULAR; INTRAVENOUS AS NEEDED
Status: DISCONTINUED | OUTPATIENT
Start: 2019-04-11 | End: 2019-04-11 | Stop reason: HOSPADM

## 2019-04-11 RX ORDER — FENTANYL CITRATE 50 UG/ML
INJECTION, SOLUTION INTRAMUSCULAR; INTRAVENOUS AS NEEDED
Status: DISCONTINUED | OUTPATIENT
Start: 2019-04-11 | End: 2019-04-11 | Stop reason: HOSPADM

## 2019-04-11 RX ORDER — DEXAMETHASONE SODIUM PHOSPHATE 4 MG/ML
INJECTION, SOLUTION INTRA-ARTICULAR; INTRALESIONAL; INTRAMUSCULAR; INTRAVENOUS; SOFT TISSUE AS NEEDED
Status: DISCONTINUED | OUTPATIENT
Start: 2019-04-11 | End: 2019-04-11 | Stop reason: HOSPADM

## 2019-04-11 RX ORDER — IBUPROFEN 800 MG/1
800 TABLET ORAL
Qty: 40 TAB | Refills: 0 | Status: SHIPPED | OUTPATIENT
Start: 2019-04-11 | End: 2020-06-26

## 2019-04-11 RX ORDER — DEXTROSE 50 % IN WATER (D50W) INTRAVENOUS SYRINGE
25-50 AS NEEDED
Status: DISCONTINUED | OUTPATIENT
Start: 2019-04-11 | End: 2019-04-17 | Stop reason: HOSPADM

## 2019-04-11 RX ORDER — CEFAZOLIN SODIUM 2 G/50ML
2 SOLUTION INTRAVENOUS
Status: COMPLETED | OUTPATIENT
Start: 2019-04-11 | End: 2019-04-11

## 2019-04-11 RX ORDER — HYDROCODONE BITARTRATE AND ACETAMINOPHEN 5; 325 MG/1; MG/1
1 TABLET ORAL
Status: DISCONTINUED | OUTPATIENT
Start: 2019-04-11 | End: 2019-04-12 | Stop reason: HOSPADM

## 2019-04-11 RX ORDER — KETOROLAC TROMETHAMINE 30 MG/ML
INJECTION, SOLUTION INTRAMUSCULAR; INTRAVENOUS AS NEEDED
Status: DISCONTINUED | OUTPATIENT
Start: 2019-04-11 | End: 2019-04-11 | Stop reason: HOSPADM

## 2019-04-11 RX ORDER — FAMOTIDINE 20 MG/1
20 TABLET, FILM COATED ORAL ONCE
Status: COMPLETED | OUTPATIENT
Start: 2019-04-11 | End: 2019-04-11

## 2019-04-11 RX ORDER — MAGNESIUM SULFATE 100 %
4 CRYSTALS MISCELLANEOUS AS NEEDED
Status: DISCONTINUED | OUTPATIENT
Start: 2019-04-11 | End: 2019-04-17 | Stop reason: HOSPADM

## 2019-04-11 RX ORDER — SODIUM CHLORIDE, SODIUM LACTATE, POTASSIUM CHLORIDE, CALCIUM CHLORIDE 600; 310; 30; 20 MG/100ML; MG/100ML; MG/100ML; MG/100ML
75 INJECTION, SOLUTION INTRAVENOUS CONTINUOUS
Status: DISCONTINUED | OUTPATIENT
Start: 2019-04-11 | End: 2019-04-11 | Stop reason: HOSPADM

## 2019-04-11 RX ORDER — LIDOCAINE HYDROCHLORIDE 20 MG/ML
INJECTION, SOLUTION EPIDURAL; INFILTRATION; INTRACAUDAL; PERINEURAL AS NEEDED
Status: DISCONTINUED | OUTPATIENT
Start: 2019-04-11 | End: 2019-04-11 | Stop reason: HOSPADM

## 2019-04-11 RX ORDER — DOCUSATE SODIUM 100 MG/1
100 CAPSULE, LIQUID FILLED ORAL 2 TIMES DAILY
Qty: 60 CAP | Refills: 2 | Status: SHIPPED | OUTPATIENT
Start: 2019-04-11 | End: 2019-07-10

## 2019-04-11 RX ORDER — SODIUM CHLORIDE 0.9 % (FLUSH) 0.9 %
5-40 SYRINGE (ML) INJECTION EVERY 8 HOURS
Status: DISCONTINUED | OUTPATIENT
Start: 2019-04-11 | End: 2019-04-17 | Stop reason: HOSPADM

## 2019-04-11 RX ORDER — ONDANSETRON 2 MG/ML
INJECTION INTRAMUSCULAR; INTRAVENOUS AS NEEDED
Status: DISCONTINUED | OUTPATIENT
Start: 2019-04-11 | End: 2019-04-11 | Stop reason: HOSPADM

## 2019-04-11 RX ORDER — ONDANSETRON 2 MG/ML
4 INJECTION INTRAMUSCULAR; INTRAVENOUS ONCE
Status: DISCONTINUED | OUTPATIENT
Start: 2019-04-11 | End: 2019-04-11 | Stop reason: HOSPADM

## 2019-04-11 RX ORDER — BUPIVACAINE HYDROCHLORIDE AND EPINEPHRINE 5; 5 MG/ML; UG/ML
INJECTION, SOLUTION EPIDURAL; INTRACAUDAL; PERINEURAL AS NEEDED
Status: DISCONTINUED | OUTPATIENT
Start: 2019-04-11 | End: 2019-04-11 | Stop reason: HOSPADM

## 2019-04-11 RX ORDER — HYDROMORPHONE HYDROCHLORIDE 2 MG/ML
0.5 INJECTION, SOLUTION INTRAMUSCULAR; INTRAVENOUS; SUBCUTANEOUS AS NEEDED
Status: DISCONTINUED | OUTPATIENT
Start: 2019-04-11 | End: 2019-04-17 | Stop reason: HOSPADM

## 2019-04-11 RX ORDER — SODIUM CHLORIDE 0.9 % (FLUSH) 0.9 %
5-40 SYRINGE (ML) INJECTION AS NEEDED
Status: DISCONTINUED | OUTPATIENT
Start: 2019-04-11 | End: 2019-04-11 | Stop reason: HOSPADM

## 2019-04-11 RX ORDER — SODIUM CHLORIDE 0.9 % (FLUSH) 0.9 %
5-40 SYRINGE (ML) INJECTION AS NEEDED
Status: DISCONTINUED | OUTPATIENT
Start: 2019-04-11 | End: 2019-04-17 | Stop reason: HOSPADM

## 2019-04-11 RX ORDER — PROPOFOL 10 MG/ML
INJECTION, EMULSION INTRAVENOUS AS NEEDED
Status: DISCONTINUED | OUTPATIENT
Start: 2019-04-11 | End: 2019-04-11 | Stop reason: HOSPADM

## 2019-04-11 RX ORDER — MIDAZOLAM HYDROCHLORIDE 1 MG/ML
INJECTION, SOLUTION INTRAMUSCULAR; INTRAVENOUS AS NEEDED
Status: DISCONTINUED | OUTPATIENT
Start: 2019-04-11 | End: 2019-04-11 | Stop reason: HOSPADM

## 2019-04-11 RX ORDER — SODIUM CHLORIDE 0.9 % (FLUSH) 0.9 %
5-40 SYRINGE (ML) INJECTION EVERY 8 HOURS
Status: DISCONTINUED | OUTPATIENT
Start: 2019-04-11 | End: 2019-04-11 | Stop reason: HOSPADM

## 2019-04-11 RX ORDER — HYDROCODONE BITARTRATE AND ACETAMINOPHEN 5; 325 MG/1; MG/1
1 TABLET ORAL
Qty: 25 TAB | Refills: 0 | Status: SHIPPED | OUTPATIENT
Start: 2019-04-11 | End: 2019-04-14

## 2019-04-11 RX ADMIN — MIDAZOLAM HYDROCHLORIDE 2 MG: 1 INJECTION, SOLUTION INTRAMUSCULAR; INTRAVENOUS at 09:05

## 2019-04-11 RX ADMIN — FAMOTIDINE 20 MG: 20 TABLET ORAL at 07:42

## 2019-04-11 RX ADMIN — FENTANYL CITRATE 50 MCG: 50 INJECTION, SOLUTION INTRAMUSCULAR; INTRAVENOUS at 09:23

## 2019-04-11 RX ADMIN — ONDANSETRON 4 MG: 2 INJECTION INTRAMUSCULAR; INTRAVENOUS at 09:22

## 2019-04-11 RX ADMIN — DEXAMETHASONE SODIUM PHOSPHATE 8 MG: 4 INJECTION, SOLUTION INTRA-ARTICULAR; INTRALESIONAL; INTRAMUSCULAR; INTRAVENOUS; SOFT TISSUE at 09:22

## 2019-04-11 RX ADMIN — KETOROLAC TROMETHAMINE 30 MG: 30 INJECTION, SOLUTION INTRAMUSCULAR; INTRAVENOUS at 09:40

## 2019-04-11 RX ADMIN — LIDOCAINE HYDROCHLORIDE 100 MG: 20 INJECTION, SOLUTION EPIDURAL; INFILTRATION; INTRACAUDAL; PERINEURAL at 09:23

## 2019-04-11 RX ADMIN — PROPOFOL 200 MG: 10 INJECTION, EMULSION INTRAVENOUS at 09:23

## 2019-04-11 RX ADMIN — SODIUM CHLORIDE, SODIUM LACTATE, POTASSIUM CHLORIDE, AND CALCIUM CHLORIDE 75 ML/HR: 600; 310; 30; 20 INJECTION, SOLUTION INTRAVENOUS at 07:42

## 2019-04-11 RX ADMIN — FENTANYL CITRATE 50 MCG: 50 INJECTION, SOLUTION INTRAMUSCULAR; INTRAVENOUS at 09:10

## 2019-04-11 RX ADMIN — CEFAZOLIN 2 G: 10 INJECTION, POWDER, FOR SOLUTION INTRAVENOUS at 09:04

## 2019-04-11 RX ADMIN — FENTANYL CITRATE 50 MCG: 50 INJECTION, SOLUTION INTRAMUSCULAR; INTRAVENOUS at 09:05

## 2019-04-11 RX ADMIN — FENTANYL CITRATE 50 MCG: 50 INJECTION, SOLUTION INTRAMUSCULAR; INTRAVENOUS at 09:27

## 2019-04-11 RX ADMIN — GLYCOPYRROLATE 0.2 MG: 0.2 INJECTION INTRAMUSCULAR; INTRAVENOUS at 09:22

## 2019-04-11 NOTE — INTERVAL H&P NOTE
H&P Update:  Hannah Coronado was seen and examined. History and physical has been reviewed. The patient has been examined.  There have been no significant clinical changes since the completion of the originally dated History and Physical.    Signed By: Susannah Granda MD     April 11, 2019 7:27 AM

## 2019-04-11 NOTE — DISCHARGE INSTRUCTIONS
[facility] Specialists  Jelena Byrne MD, [facility]  General Surgery    Pt may remove the dressing and shower in two days. Allow soap and water to run over the incision. Please apply an ice pack to the left upper quadrant abdominal wall for 30 minutes 3 times daily for 7 days. No driving or operating heavy machinery while on narcotic pain medications. No strenuous activity or contact sports for two weeks. No lifting greater than 15 lbs for 2 weeks. Call MD for any redness, swelling, bleeding or pus at the incision. Also call for any nausea, vomiting, increased pain or pain uncontrolled by pain medicine. Learning About Benign Soft Tissue Tumors  What is a benign soft tissue tumor? A soft tissue tumor is a growth of abnormal cells in the body's soft tissues. These tissues include the muscles, lymph and blood vessels, nerves, and fat. They can also include cartilage and other connective tissues. When a tumor is benign (say \"bih-NYN\"), that means it's not cancer. Most soft tissue tumors are benign. Benign tumors don't spread to other tissues and organs. They usually aren't life-threatening. But they can cause problems if they grow too much, press on nerves, or cause pain. What are some common types of these tumors? Some common types of benign soft tissue tumors include:  Lipomas. These tumors form from fat cells. Angiolipomas are a type of lipoma made up of fat and blood vessels. Nerve sheath tumors. Tumors on a nerve may include schwannomas and neurofibromas. They might need to be removed. Benign synovial tumors. These appear around the tendons, near the knee, hip, elbow, or shoulder. Examples include giant cell tumor of the tendon sheath and synovial chondromatosis. Hemangiomas. These are tumors of the blood vessels. Desmoid tumors. These tumors commonly appear on the shoulder, chest, back, and thighs. Nodular fasciitis. These are most common in the arms.  They can grow quickly. Other types of tumors may appear on the skin, belly, arms and legs, organs, and nerves. What are the symptoms? Sometimes a tumor can be felt as a bump under the skin. Or if the tumor is deep enough below the skin, you may not be able to feel it. You may also feel pain near the tumor if it's large or pressing on something. How are these tumors diagnosed? Your doctor will ask you about your symptoms and past health and will examine you. A physical exam can help your doctor diagnose some soft tissue tumors. Your doctor may find a tumor when taking X-rays or other imaging tests for another problem. If your doctor isn't sure what the growth is and your symptoms could be signs of a tumor, you will get some tests. The tests can help make sure it's not cancer. They can also help your doctor figure out the best treatment for the tumor. · You may have one or more imaging tests to get a better look at the tumor. These may include:  ? X-rays. ? Ultrasound tests. ? CT scan.  ? MRI scan. · You may need blood tests and lab work. · You may need a biopsy so a sample of the tumor can be looked at under a microscope. This sample may also be used to test for biomarkers. They will help with planning treatment. Doctors may also look at other parts of your body for other tumors. How are they treated? Some benign soft tissue tumors that aren't causing problems can be watched over time. Some may remain stable or go away on their own. But if the tumor causes pain, is growing larger, or affects your movement, it may need to be removed. You may also choose to have these tumors removed if they bother you or if you don't like how they look. Doctors may remove some tumors with surgery. In some cases, other treatments, including medicines, may be used. Talk with your doctor or specialist about other types of treatments for the tumor.  After your treatment, your doctor may want to check the area again to make sure that the growth doesn't come back. Follow-up care is a key part of your treatment and safety. Be sure to make and go to all appointments, and call your doctor if you are having problems. It's also a good idea to know your test results and keep a list of the medicines you take. Where can you learn more? Go to http://misha-blue.info/. Enter S210 in the search box to learn more about \"Learning About Benign Soft Tissue Tumors. \"  Current as of: March 27, 2018  Content Version: 11.9  © 0108-1826 [employer]. Care instructions adapted under license by [employer] (which disclaims liability or warranty for this information). If you have questions about a medical condition or this instruction, always ask your healthcare professional. [place] [address] any warranty or liability for your use of this information. Narcotic-Analgesic/Acetaminophen (Percocet, Norco, Lorcet HD, Lortab 10/325) - (By mouth)   Why this medicine is used:   Relieves pain. Contact a nurse or doctor right away if you have:  · Extreme weakness, shallow breathing, slow heartbeat  · Severe confusion, lightheadedness, dizziness, fainting  · Yellow skin or eyes, dark urine or pale stools  · Severe constipation, severe stomach pain, nausea, vomiting, loss of appetite  · Sweating or cold, clammy skin     Common side effects:  · Mild constipation, nausea, vomiting  · Sleepiness, tiredness  · Itching, rash  © 2017 [facility] Information is for End User's use only and may not be sold, redistributed or otherwise used for commercial purposes.        DISCHARGE SUMMARY from Nurse    PATIENT INSTRUCTIONS:    After general anesthesia or intravenous sedation, for 24 hours or while taking prescription Narcotics:  · Limit your activities  · Do not drive and operate hazardous machinery  · Do not make important personal or business decisions  · Do  not drink alcoholic beverages  · If you have not urinated within 8 hours after discharge, please contact your surgeon on call. Report the following to your surgeon:  · Excessive pain, swelling, redness or odor of or around the surgical area  · Temperature over 100.5  · Nausea and vomiting lasting longer than 4 hours or if unable to take medications  · Any signs of decreased circulation or nerve impairment to extremity: change in color, persistent  numbness, tingling, coldness or increase pain  · Any questions      *  Please give a list of your current medications to your Primary Care Provider. *  Please update this list whenever your medications are discontinued, doses are      changed, or new medications (including over-the-counter products) are added. *  Please carry medication information at all times in case of emergency situations. These are general instructions for a healthy lifestyle:    No smoking/ No tobacco products/ Avoid exposure to second hand smoke  Surgeon General's Warning:  Quitting smoking now greatly reduces serious risk to your health. Obesity, smoking, and sedentary lifestyle greatly increases your risk for illness    A healthy diet, regular physical exercise & weight monitoring are important for maintaining a healthy lifestyle    You may be retaining fluid if you have a history of heart failure or if you experience any of the following symptoms:  Weight gain of 3 pounds or more overnight or 5 pounds in a week, increased swelling in our hands or feet or shortness of breath while lying flat in bed. Please call your doctor as soon as you notice any of these symptoms; do not wait until your next office visit. Recognize signs and symptoms of STROKE:    F-face looks uneven    A-arms unable to move or move unevenly    S-speech slurred or non-existent    T-time-call 911 as soon as signs and symptoms begin-DO NOT go       Back to bed or wait to see if you get better-TIME IS BRAIN.     Warning Signs of HEART ATTACK     Call 911 if you have these symptoms:   Chest discomfort. Most heart attacks involve discomfort in the center of the chest that lasts more than a few minutes, or that goes away and comes back. It can feel like uncomfortable pressure, squeezing, fullness, or pain.  Discomfort in other areas of the upper body. Symptoms can include pain or discomfort in one or both arms, the back, neck, jaw, or stomach.  Shortness of breath with or without chest discomfort.  Other signs may include breaking out in a cold sweat, nausea, or lightheadedness. Don't wait more than five minutes to call 911 - MINUTES MATTER! Fast action can save your life. Calling 911 is almost always the fastest way to get lifesaving treatment. Emergency Medical Services staff can begin treatment when they arrive -- up to an hour sooner than if someone gets to the hospital by car. The discharge information has been reviewed with the patient/parents. The patient/parents verbalized understanding. Discharge medications reviewed with the patient/parents and appropriate educational materials and side effects teaching were provided.   ___________________________________________________________________________________________________________________________________

## 2019-04-11 NOTE — DISCHARGE SUMMARY
[facility]  Rema Bruner MD, [facility]  General Surgery  Discharge Summary     Patient ID:  Keith Castro  [id number]  30 y.o.  1995    Admit Date: 4/11/2019    Discharge Date: 4/11/2019    Admission Diagnoses: M79.9 SOFT TISSUE MASS R19.02 ABDOMINAL WALL MASS OF LUQ    Discharge Diagnoses:    Problem List as of 4/11/2019 Date Reviewed: 3/6/2019          Codes Class Noted - Resolved    Vitamin D deficiency ICD-10-CM: E55.9  ICD-9-CM: 268.9  8/3/2018 - Present        Cough due to bronchospasm ICD-10-CM: J98.01  ICD-9-CM: 519.11  3/12/2014 - Present        AR (allergic rhinitis) ICD-10-CM: J30.9  ICD-9-CM: 477.9  3/12/2014 - Present               Admission Condition: Good    Discharge Condition: Good    Last Procedure: Procedure(s):  EXCISIONAL BIOPSY LEFT UPPER ABDOMINAL WALL MASS    Hospital Course:   Normal hospital course for this procedure. Consults: None    Significant Diagnostic Studies: None    Disposition: home    Patient Instructions:   Current Discharge Medication List      START taking these medications    Details   HYDROcodone-acetaminophen (NORCO) 5-325 mg per tablet Take 1 Tab by mouth every six (6) hours as needed for Pain for up to 3 days. Max Daily Amount: 4 Tabs. Qty: 25 Tab, Refills: 0    Associated Diagnoses: Postoperative pain      ibuprofen (MOTRIN) 800 mg tablet Take 1 Tab by mouth three (3) times daily as needed for Pain. Qty: 40 Tab, Refills: 0      docusate sodium (COLACE) 100 mg capsule Take 1 Cap by mouth two (2) times a day for 90 days. Qty: 60 Cap, Refills: 2         CONTINUE these medications which have NOT CHANGED    Details   cholecalciferol, vitamin D3, (VITAMIN D3) 2,000 unit tab Take 2,000 Units by mouth daily. multivitamin (ONE A DAY) tablet Take 1 Tab by mouth daily.          STOP taking these medications       acetaminophen (TYLENOL EXTRA STRENGTH) 500 mg tablet Comments:   Reason for Stopping:             Activity: See surgical instructions  Diet: Regular Diet  Wound Care: As directed    Follow-up with Dr. Alok Morse in 2 weeks.   Follow-up tests/labs none    Signed:  Eh Jackson MD  4/11/2019  8:37 AM

## 2019-04-11 NOTE — OP NOTES
[address] [place] [address] Dr [facility], [address] [facility]                                 OPERATIVE REPORT    PATIENT:    Helio Cash  MRN:           [id number]   DATE:   2019  BILLIN  ROOM:       /  ATTENDING:   Sherryle Freund, MD  DICTATING:   Sherryle Freund, MD      Preoperative Dx: Abdominal wall masses left upper quadrant    Postoperative Dx: Same     Procedure: Excisional biopsy of left upper quadrant abdominal wall masses    Surgeon:  Mily Palomino MD    Assistant: Yo Mchugh SA    Anesthesia:  General and Local -  .50% Marcaine plain with epinephrine    Findings:   Soft tissue masses of the left upper quadrant abdominal wall    Specimen: Soft tissue masses of the left upper quadrant abdominal    EBL: 10 mL    Fluid: 484 mL    Complications:  None    Brief Operative Indication:   Soft tissue masses of the left upper quadrant abdominal wall    Description of operation :  The patient was identified in the preoperative area. Informed consent was obtained from the patient. Time out was performed to insure correct procedure. The patient was positioned supine on the table. The skin was prepped with chlorhexidine and sterile drape applied. The local anesthetic was infiltrated into the skin and subcutaneous tissues. The fifteen blade was used to create an incision 3 cm L  X 1 cm W to excise the 1cm L X 1 cm W mass. The electrocautery was used to completely excise the entire cyst including the cyst wall. The deepest layer of dissection was the subcutaneous tissue. Dissection proceeded cephalad to excise the second soft tissue mass. The mass was grasped with an Allis. Electrocautery was used to completely dissect the mass from surrounding tissue. Electrocautery was used for hemostasis. The wound was cleaned with sterile saline.   The deep dermal tissues were re approximated using 3-0 Vicryl suture with interrupted simple stitches. The skin was re approximated using 4-0 Vicryl suture in a running subcuticular closure. Mastisol, Steri-Strips and Band-Aids were used to create a sterile dressing. She tolerated the procedure well. Disposition: She was stable transport to the recovery.

## 2019-04-11 NOTE — ANESTHESIA POSTPROCEDURE EVALUATION
Procedure(s): EXCISIONAL BIOPSY LEFT UPPER ABDOMINAL WALL MASS. general 
 
Anesthesia Post Evaluation Multimodal analgesia: multimodal analgesia used between 6 hours prior to anesthesia start to PACU discharge Patient location during evaluation: PACU Patient participation: complete - patient participated Level of consciousness: awake Pain management: adequate Airway patency: patent Anesthetic complications: no 
Cardiovascular status: acceptable Respiratory status: acceptable Hydration status: acceptable Post anesthesia nausea and vomiting:  none Vitals Value Taken Time /86 4/11/2019 10:38 AM  
Temp 36.9 °C (98.5 °F) 4/11/2019 10:01 AM  
Pulse 87 4/11/2019 10:42 AM  
Resp 12 4/11/2019 10:42 AM  
SpO2 100 % 4/11/2019 10:42 AM  
Vitals shown include unvalidated device data.

## 2019-04-11 NOTE — ANESTHESIA PREPROCEDURE EVALUATION
Relevant Problems No relevant active problems Anesthetic History No history of anesthetic complications Review of Systems / Medical History Patient summary reviewed and pertinent labs reviewed Pulmonary Within defined limits Neuro/Psych Within defined limits Cardiovascular Within defined limits Exercise tolerance: >4 METS 
  
GI/Hepatic/Renal 
Within defined limits Endo/Other Obesity Other Findings Physical Exam 
 
Airway Mallampati: I 
TM Distance: 4 - 6 cm Neck ROM: normal range of motion Mouth opening: Normal 
 
 Cardiovascular Rhythm: regular Rate: normal 
 
 
 
 Dental 
No notable dental hx Pulmonary Breath sounds clear to auscultation Abdominal 
GI exam deferred Other Findings Anesthetic Plan ASA: 1 Anesthesia type: general 
 
 
 
 
Induction: Intravenous Anesthetic plan and risks discussed with: Patient

## 2019-04-11 NOTE — PERIOP NOTES
Patient confirmed by two identifiers with discharge instructions prior too being provided to patient and parents.

## 2019-04-16 ENCOUNTER — OFFICE VISIT (OUTPATIENT)
Dept: SURGERY | Age: 24
End: 2019-04-16

## 2019-04-16 VITALS
BODY MASS INDEX: 31.28 KG/M2 | HEIGHT: 58 IN | HEART RATE: 92 BPM | SYSTOLIC BLOOD PRESSURE: 118 MMHG | DIASTOLIC BLOOD PRESSURE: 72 MMHG | OXYGEN SATURATION: 98 % | TEMPERATURE: 98.2 F | WEIGHT: 149 LBS | RESPIRATION RATE: 16 BRPM

## 2019-04-16 DIAGNOSIS — Z09 POSTOPERATIVE EXAMINATION: Primary | ICD-10-CM

## 2019-04-16 NOTE — PROGRESS NOTES
[facility]  General Surgery    Name: Epi Tran MRN: [id number]   : 1995 [facility]   Date: 2019 Admission Date: No admission date for patient encounter. Subjective:  Patient presents today with complaints of pain in the left upper quadrant surgery site. She denies any fever chills nausea vomiting. The incisional area is tender to touch. Objective:  Vitals:    19 0941   BP: 118/72   Pulse: 92   Resp: 16   Temp: 98.2 °F (36.8 °C)   TempSrc: Oral   SpO2: 98%   Weight: 67.6 kg (149 lb)   Height: 4' 10\" (1.473 m)       Physical Exam:    General: Awake and alert, oriented x4, no apparent distress   Abdomen: abdomen is soft with left upper quadrant incisional tenderness. Incision(s) are C/D/I. No   masses, organomegaly or guarding    Current Medications:  Current Outpatient Medications   Medication Sig Dispense Refill    ibuprofen (MOTRIN) 800 mg tablet Take 1 Tab by mouth three (3) times daily as needed for Pain. 40 Tab 0    docusate sodium (COLACE) 100 mg capsule Take 1 Cap by mouth two (2) times a day for 90 days. 60 Cap 2     Facility-Administered Medications Ordered in Other Visits   Medication Dose Route Frequency Provider Last Rate Last Dose    sodium chloride (NS) flush 5-40 mL  5-40 mL IntraVENous Q8H KenyattasonElizabete Tonos, CRNA        sodium chloride (NS) flush 5-40 mL  5-40 mL IntraVENous PRN Stephanie Da Silva, CRNA        glucose chewable tablet 16 g  4 Tab Oral PRN Molson, Stephanie Nims, CRNA        glucagon (GLUCAGEN) injection 1 mg  1 mg IntraMUSCular PRN Elizabet Da Silvae Andrews, CRNA        dextrose (D50W) injection syrg 12.5-25 g  25-50 mL IntraVENous PRN Molson, Stephanie Tonos, CRNA        HYDROmorphone (PF) (DILAUDID) injection 0.5 mg  0.5 mg IntraVENous PRN Jose Vilchis CRNA           Chart and notes reviewed. Data reviewed. I have evaluated and examined the patient.         IMPRESSION:   · Patient doing well following excisional biopsy of left upper quadrant soft tissue mass acute folliculitis on pathology. PLAN:/DISCUSION:   · Patient can return to work restrictions of no lifting greater than 15 pounds on 23 April. · She will follow-up with us for reassessment on or around 7 May.         Raul Vázquez MD

## 2019-05-07 ENCOUNTER — OFFICE VISIT (OUTPATIENT)
Dept: SURGERY | Age: 24
End: 2019-05-07

## 2019-05-07 VITALS
RESPIRATION RATE: 18 BRPM | BODY MASS INDEX: 31.56 KG/M2 | WEIGHT: 151 LBS | DIASTOLIC BLOOD PRESSURE: 78 MMHG | TEMPERATURE: 98.4 F | HEART RATE: 98 BPM | SYSTOLIC BLOOD PRESSURE: 119 MMHG

## 2019-05-07 DIAGNOSIS — R19.02 ABDOMINAL WALL MASS OF LEFT UPPER QUADRANT: ICD-10-CM

## 2019-05-07 DIAGNOSIS — Z09 POSTOPERATIVE EXAMINATION: ICD-10-CM

## 2019-05-07 DIAGNOSIS — G89.18 POSTOPERATIVE PAIN: Primary | ICD-10-CM

## 2019-05-07 RX ORDER — ACETAMINOPHEN 500 MG
1000 TABLET ORAL
COMMUNITY
End: 2022-06-23

## 2019-05-07 NOTE — PROGRESS NOTES
Patrick Gonzalez M.D. FACS  PROGRESS NOTE        Subjective:  Patient presents today to reassess her return to work status. The left upper quadrant soreness and tenderness is still present however she has decreased pain compared to last visit. She states that she still has pain mostly when she attempts to pull and lift. Objective:  Vitals:    05/07/19 1023   BP: 119/78   Pulse: 98   Resp: 18   Temp: 98.4 °F (36.9 °C)   Weight: 68.5 kg (151 lb)       Physical Exam:    General: in no apparent distress    Skin: The left upper quadrant incision is well approximated and healing well. No surrounding cellulitis fluctuance or crepitance. Current Medications:  Current Outpatient Medications   Medication Sig Dispense Refill    acetaminophen (TYLENOL EXTRA STRENGTH) 500 mg tablet Take 1,000 mg by mouth every six (6) hours as needed for Pain.  ibuprofen (MOTRIN) 800 mg tablet Take 1 Tab by mouth three (3) times daily as needed for Pain. 40 Tab 0    cholecalciferol, vitamin D3, (VITAMIN D3) 2,000 unit tab Take 2,000 Units by mouth daily.  multivitamin (ONE A DAY) tablet Take 1 Tab by mouth daily.  docusate sodium (COLACE) 100 mg capsule Take 1 Cap by mouth two (2) times a day for 90 days. 60 Cap 2       Chart and notes reviewed. Data reviewed. I have evaluated and examined the patient. Impression and Plan:  Patient healing slowly following excision of acute folliculitis of the left upper quadrant of the abdominal wall. Patient may return to work with restrictions of no lifting or pulling greater than 20 pounds. She will follow-up with us in 3 weeks for reassessment.      Bertha Richmond MD

## 2019-05-17 ENCOUNTER — OFFICE VISIT (OUTPATIENT)
Dept: FAMILY MEDICINE CLINIC | Age: 24
End: 2019-05-17

## 2019-05-17 VITALS
HEART RATE: 68 BPM | TEMPERATURE: 98.2 F | HEIGHT: 58 IN | RESPIRATION RATE: 16 BRPM | DIASTOLIC BLOOD PRESSURE: 68 MMHG | BODY MASS INDEX: 30.98 KG/M2 | WEIGHT: 147.6 LBS | SYSTOLIC BLOOD PRESSURE: 110 MMHG | OXYGEN SATURATION: 98 %

## 2019-05-17 DIAGNOSIS — L73.9 FOLLICULITIS: Primary | ICD-10-CM

## 2019-05-17 NOTE — PROGRESS NOTES
1. Have you been to the ER, urgent care clinic since your last visit? Hospitalized since your last visit? [facility] 4/11/19 surgery - Dr. Deedee Carty    2. Have you seen or consulted any other health care providers outside of the [address] [place] [address] since your last visit? Include any pap smears or colon screening.  No

## 2019-05-17 NOTE — PATIENT INSTRUCTIONS
Folliculitis: Care Instructions  Your Care Instructions    Folliculitis (say \"qih-NDF-ecr-LY-tus\") is an infection of the pouches (follicles) in the skin where hair grows. It can occur on any part of the body, but it is most common on the scalp, face, armpits, and groin. Bacteria, such as those found in a hot tub, can cause folliculitis. Folliculitis begins as a red, tender area near a strand of hair. The skin can itch or burn and may drain pus or blood. Sometimes folliculitis can lead to more serious skin infections. Your doctor usually can treat mild folliculitis with an antibiotic cream or ointment. If you have folliculitis on your scalp, you may use a shampoo that kills bacteria. Antibiotics you take as pills can treat infections deeper in the skin. For stubborn cases of folliculitis, laser treatment may be an option. Laser treatment uses strong beams of light to destroy the hair follicle. But hair will no longer grow in the treated area. Follow-up care is a key part of your treatment and safety. Be sure to make and go to all appointments, and call your doctor if you are having problems. It's also a good idea to know your test results and keep a list of the medicines you take. How can you care for yourself at home? · Take your medicine exactly as prescribed. If your doctor prescribed antibiotics, take them as directed. Do not stop taking them just because you feel better. You need to take the full course of antibiotics. · Use a soap that kills bacteria to wash the infected area. If your scalp or beard is infected, use a shampoo with selenium or propylene glycol. Be careful. Do not scrub too long or too hard. · Mix 1 1/3 cup warm water and 1 tablespoon vinegar. Soak a cloth in the mixture, and place it over the infected skin until it cools off (usually 5 to 10 minutes). You can do this 3 to 6 times a day. · Do not share your razor, towel, or washcloth. That can spread folliculitis.   · Use a new blade in your razor each time you shave to keep from re-infecting your skin. · If you tend to get folliculitis, avoid using hot tubs. They can contain bacteria that cause folliculitis. When should you call for help? Call your doctor now or seek immediate medical care if:    · You have symptoms of infection, such as:  ? Increased pain, swelling, warmth, or redness. ? Red streaks leading from the area. ? Pus draining from the area. ? A fever.    Watch closely for changes in your health, and be sure to contact your doctor if:    · You do not get better as expected. Where can you learn more? Go to http://misha-blue.info/. Enter M257 in the search box to learn more about \"Folliculitis: Care Instructions. \"  Current as of: April 17, 2018  Content Version: 11.9  © 7776-2243 [employer]. Care instructions adapted under license by [employer] (which disclaims liability or warranty for this information). If you have questions about a medical condition or this instruction, always ask your healthcare professional. [place] [address] any warranty or liability for your use of this information. HPV (Human Papillomavirus) Vaccine Gardasil®: What You Need to Know  What is HPV? Genital human papillomavirus (HPV) is the most common sexually transmitted virus in the [place]. More than half of sexually active men and women are infected with HPV at some time in their lives. About 20 million Americans are currently infected, and about 6 million more get infected each year. HPV is usually spread through sexual contact. Most HPV infections don't cause any symptoms, and go away on their own. But HPV can cause cervical cancer in women. Cervical cancer is the 2nd leading cause of cancer deaths among women around the world. In the [place], about 12,000 women get cervical cancer every year and about 4,000 are expected to die from it.   HPV is also associated with several less common cancers, such as vaginal and vulvar cancers in women, and anal and oropharyngeal (back of the throat, including base of tongue and tonsils) cancers in both men and women. HPV can also cause genital warts and warts in the throat. There is no cure for HPV infection, but some of the problems it causes can be treated. HPV vaccine-Why get vaccinated? The HPV vaccine you are getting is one of two vaccines that can be given to prevent HPV. It may be given to both males and females. This vaccine can prevent most cases of cervical cancer in females, if it is given before exposure to the virus. In addition, it can prevent vaginal and vulvar cancer in females, and genital warts and anal cancer in both males and females. Protection from HPV vaccine is expected to be long-lasting. But vaccination is not a substitute for cervical cancer screening. Women should still get regular Pap tests. Who should get this HPV vaccine and when? HPV vaccine is given as a 3-dose series  · 1st Dose: Now  · 2nd Dose: 1 to 2 months after Dose 1  · 3rd Dose: 6 months after Dose 1  Additional (booster) doses are not recommended. Routine vaccination  · This HPV vaccine is recommended for girls and boys 6or 15years of age. It may be given starting at age 5. Why is HPV vaccine recommended at 6or 15years of age? HPV infection is easily acquired, even with only one sex partner. That is why it is important to get HPV vaccine before any sexual contact takes place. Also, response to the vaccine is better at this age than at older ages. Catch-up vaccination  This vaccine is recommended for the following people who have not completed the 3-dose series:  · Females 15 through 32years of age  · Males 15 through 24years of age  This vaccine may be given to men 25 through 32years of age who have not completed the 3-dose series.   It is recommended for men through age 32 who have sex with men or whose immune system is weakened because of HIV infection, other illness, or medications. HPV vaccine may be given at the same time as other vaccines. Some people should not get HPV vaccine or should wait  · Anyone who has ever had a life-threatening allergic reaction to any component of HPV vaccine, or to a previous dose of HPV vaccine, should not get the vaccine. Tell your doctor if the person getting vaccinated has any severe allergies, including an allergy to yeast.  · HPV vaccine is not recommended for pregnant women. However, receiving HPV vaccine when pregnant is not a reason to consider terminating the pregnancy. Women who are breast feeding may get the vaccine. · People who are mildly ill when a dose of HPV vaccine is planned can still be vaccinated. People with a moderate or severe illness should wait until they are better. What are the risks from this vaccine? This HPV vaccine has been used in the [place] and around the world for about six years and has been very safe. However, any medicine could possibly cause a serious problem, such as a severe allergic reaction. The risk of any vaccine causing a serious injury, or death, is extremely small. Life-threatening allergic reactions from vaccines are very rare. If they do occur, it would be within a few minutes to a few hours after the vaccination. Several mild to moderate problems are known to occur with this HPV vaccine. These do not last long and go away on their own. · Reactions in the arm where the shot was given:  ? Pain (about 8 people in 10)  ? Redness or swelling (about 1 person in 4)  · Fever  ? Mild (100°F) (about 1 person in 10)  ? Moderate (102°F) (about 1 person in 65)  · Other problems:  ? Headache (about 1 person in 3)  · Fainting: Brief fainting spells and related symptoms (such as jerking movements) can happen after any medical procedure, including vaccination.  Sitting or lying down for about 15 minutes after a vaccination can help prevent fainting and injuries caused by falls. Tell your doctor if the patient feels dizzy or light-headed, or has vision changes or ringing in the ears. Like all vaccines, HPV vaccines will continue to be monitored for unusual or severe problems. What if there is a serious reaction? What should I look for? · Look for anything that concerns you, such as signs of a severe allergic reaction, very high fever, or behavior changes. Signs of a severe allergic reaction can include hives, swelling of the face and throat, difficulty breathing, a fast heartbeat, dizziness, and weakness. These would start a few minutes to a few hours after the vaccination. What should I do? · If you think it is a severe allergic reaction or other emergency that can't wait, call 9-1-1 or get the person to the nearest hospital. Otherwise, call your doctor. · Afterward, the reaction should be reported to the Vaccine Adverse Event Reporting System (VAERS). Your doctor might file this report, or you can do it yourself through the VAERS web site at www.vaers. [facility].gov, or by calling 7-401.276.4789. VAERS is only for reporting reactions. They do not give medical advice. The National Vaccine Injury Compensation Program  The National Vaccine Injury Compensation Program (VICP) is a federal program that was created to compensate people who may have been injured by certain vaccines. Persons who believe they may have been injured by a vaccine can learn about the program and about filing a claim by calling 8-788.693.8360 or visiting the [employer] website at www.[facility].gov/vaccinecompensation. How can I learn more? · Ask your doctor. · Call your local or state health department. · Contact the Centers for Disease Control and Prevention (CDC):  ? Call 4-211.544.4606 (1-474-BJD-INFO) or  ? Visit the CDC's website at www.cdc.gov/vaccines. Vaccine Information Statement (Interim)  HPV Vaccine (Gardasil)  (5/17/2013)  [facility] Sample [id number]  Department of Health and Human Services  Centers for Disease Control and Prevention  Many Vaccine Information Statements are available in Armenian and other languages. See www.immunize.org/vis. Muchas hojas de información sobre vacunas están disponibles en español y en otros idiomas. Visite www.immunize.org/vis. Care instructions adapted under license by [employer] (which disclaims liability or warranty for this information). If you have questions about a medical condition or this instruction, always ask your healthcare professional. [address] any warranty or liability for your use of this information. Learning About the HPV Vaccine  What is the HPV vaccine? The HPV (human papillomavirus) vaccine protects against HPV. HPV is a common sexually transmitted infection (STI). There are many types of HPV. Some types of the virus can cause genital warts. Other types can cause cervical or oral cancer and some uncommon cancers, such as anal and vaginal cancer. The HPV vaccine is given as a series of shots. Who should get the vaccine? Experts recommend that girls and boys age 6 or 15 get the HPV vaccine, but the vaccine can be given from age 5 to 32. Children ages 5 to 15 get the vaccine in a series of two shots over 6 months. Children age 13 years and older get the vaccine as a three-dose series. For the vaccine to work best, all shots in the series must be given. What else do you need to know? The best time for a person to get the vaccine is before becoming sexually active. This is because the vaccine works best before there is any chance of infection with HPV. When the vaccine is given at this time, it can prevent almost all infection by the types of HPV the vaccine guards against. If the person has already been infected with the virus, the vaccine does not provide protection against the virus. Having the HPV vaccine does not change your need for Pap tests.  Women who have had the HPV vaccine should follow the same Pap test schedule as women who have not had the vaccine. If you are a parent of a child who's getting the shot, talk to your child about HPV and the vaccine. It's a chance to teach your child about safer sex and STIs. Having your child get the shot doesn't mean you're giving your child permission to have sex. The vaccine can have side effects. Common side effects from the vaccine include headache, fever, and redness or swelling at the site of the shot. More serious side effects, such as fainting, are rare. · Take an over-the-counter pain medicine, such as acetaminophen (Tylenol) or ibuprofen (Advil, Motrin), to relieve common side effects. Read and follow all instructions on the label. · Put ice or a cold pack on the sore area for 10 to 20 minutes at a time. Put a thin cloth between the ice and your skin. Where can you learn more? Go to http://misha-blue.info/. Enter S481 in the search box to learn more about \"Learning About the HPV Vaccine. \"  Current as of: June 17, 2018  Content Version: 11.9  © 1851-5587 [employer]. Care instructions adapted under license by [employer] (which disclaims liability or warranty for this information). If you have questions about a medical condition or this instruction, always ask your healthcare professional. [address] any warranty or liability for your use of this information.

## 2019-05-17 NOTE — PROGRESS NOTES
HISTORY OF PRESENT ILLNESS  Santa Camacho is a 20 y.o. female. HPI: here for follow up after excision biopsy of lump over left upper abdominal area. Done by Dr. Stefany Frank. Review surgeon post op notes and no new recommendations. She has also follow up scheduled. Review pathology report and noted folliculitis. She had lump removed. Still has some pain over surgical site but improving gradually. Mild intensity and no radiation of pain. On light duty. Following surgeon's recommendations. No sob. No cough or cold. No chest pain. No nausea or vomiting. Appetite been ok. No weight changes. No urinary or bowel complains. Visit Vitals  /68 (BP 1 Location: Left arm, BP Patient Position: Sitting)   Pulse 68   Temp 98.2 °F (36.8 °C) (Oral)   Resp 16   Ht 4' 10\" (1.473 m)   Wt 147 lb 9.6 oz (67 kg)   SpO2 98%   BMI 30.85 kg/m²     Review medication list, vitals, problem list,allergies. ROS: see HPI     Physical Exam   Constitutional: She is oriented to person, place, and time. No distress. Cardiovascular: Normal heart sounds. Pulmonary/Chest: No respiratory distress. She has no wheezes. Abdominal:   Mild tenderness over surgical site. Noted healed well. No swelling or redness. Musculoskeletal: She exhibits no edema. Neurological: She is oriented to person, place, and time. ASSESSMENT and PLAN    ICD-10-CM ICD-9-CM    1. Folliculitis: removal of lump over left upper quadrant. Continue ice  Application. Tylenol or motrin otc with food for pain. Keep appt with surgeon as recommended. L73.9 704.8    Pt understood and agree with the plan     Follow-up and Dispositions    · Return in about 1 year (around 5/17/2020).

## 2019-05-28 ENCOUNTER — OFFICE VISIT (OUTPATIENT)
Dept: SURGERY | Age: 24
End: 2019-05-28

## 2019-05-28 VITALS
RESPIRATION RATE: 16 BRPM | TEMPERATURE: 98.7 F | SYSTOLIC BLOOD PRESSURE: 125 MMHG | WEIGHT: 150 LBS | BODY MASS INDEX: 31.35 KG/M2 | HEART RATE: 86 BPM | DIASTOLIC BLOOD PRESSURE: 78 MMHG

## 2019-05-28 DIAGNOSIS — Z09 POSTOPERATIVE EXAMINATION: Primary | ICD-10-CM

## 2019-05-28 NOTE — PROGRESS NOTES
[facility]  General Surgery    Name: Craig Sol MRN: [id number]   : 1995 Hospital: [facility]   Date: 2019 Admission Date: No admission date for patient encounter. Subjective:  Patient without complaints. She states that she thinks that she can do her job at full capacity. Objective:  Vitals:    19 1409   BP: 125/78   Pulse: 86   Resp: 16   Temp: 98.7 °F (37.1 °C)   Weight: 68 kg (150 lb)       Physical Exam:    General: Awake and alert, no apparent distress   Abdomen: abdomen is soft with minimal left upper quadrant tenderness. Incision(s) are C/D/I. No   masses, organomegaly or guarding    Current Medications:  Current Outpatient Medications   Medication Sig Dispense Refill    acetaminophen (TYLENOL EXTRA STRENGTH) 500 mg tablet Take 1,000 mg by mouth every six (6) hours as needed for Pain.  cholecalciferol, vitamin D3, (VITAMIN D3) 2,000 unit tab Take 2,000 Units by mouth daily.  multivitamin (ONE A DAY) tablet Take 1 Tab by mouth daily.  ibuprofen (MOTRIN) 800 mg tablet Take 1 Tab by mouth three (3) times daily as needed for Pain. 40 Tab 0    docusate sodium (COLACE) 100 mg capsule Take 1 Cap by mouth two (2) times a day for 90 days. 60 Cap 2       Chart and notes reviewed. Data reviewed. I have evaluated and examined the patient. IMPRESSION:   · Patient healing well. PLAN:/DISCUSION:   · Return to work without restrictions Friday, May 31.   · Follow-up PRN        Debbi Nguyen MD

## 2019-05-28 NOTE — LETTER
NOTIFICATION OF RETURN TO WORK / SCHOOL 
 
5/28/2019 2:36 PM 
 
Ms. Eladio Mondragon [id number] [address] [address] Eloise Felice To Whom It May Concern: 
 
Eladio Mondragon was seen at [address] on May 28, 2019. She will be able to return to wor on May 31, 2019 with no restrictions. If there are questions or concerns please have the patient contact our office. Sincerely, Lisa Martinez MD

## 2020-02-04 ENCOUNTER — HOSPITAL ENCOUNTER (OUTPATIENT)
Dept: GENERAL RADIOLOGY | Age: 25
Discharge: HOME | End: 2020-02-04
Payer: COMMERCIAL

## 2020-02-04 ENCOUNTER — OFFICE VISIT (OUTPATIENT)
Dept: FAMILY MEDICINE CLINIC | Age: 25
End: 2020-02-04

## 2020-02-04 VITALS
HEIGHT: 58 IN | OXYGEN SATURATION: 98 % | DIASTOLIC BLOOD PRESSURE: 80 MMHG | WEIGHT: 154 LBS | TEMPERATURE: 99.3 F | BODY MASS INDEX: 32.32 KG/M2 | SYSTOLIC BLOOD PRESSURE: 140 MMHG | RESPIRATION RATE: 16 BRPM | HEART RATE: 104 BPM

## 2020-02-04 DIAGNOSIS — R05.9 COUGH: ICD-10-CM

## 2020-02-04 DIAGNOSIS — R50.9 FEVER, UNSPECIFIED FEVER CAUSE: ICD-10-CM

## 2020-02-04 DIAGNOSIS — R03.0 ELEVATED BLOOD PRESSURE READING: ICD-10-CM

## 2020-02-04 DIAGNOSIS — R09.89 CHEST CONGESTION: ICD-10-CM

## 2020-02-04 DIAGNOSIS — J02.9 SORE THROAT: Primary | ICD-10-CM

## 2020-02-04 LAB
QUICKVUE INFLUENZA TEST: NEGATIVE
S PYO AG THROAT QL: NEGATIVE
VALID INTERNAL CONTROL?: YES
VALID INTERNAL CONTROL?: YES

## 2020-02-04 PROCEDURE — 71046 X-RAY EXAM CHEST 2 VIEWS: CPT

## 2020-02-04 RX ORDER — AZITHROMYCIN 250 MG/1
TABLET, FILM COATED ORAL
Qty: 6 TAB | Refills: 0 | Status: SHIPPED | OUTPATIENT
Start: 2020-02-04 | End: 2020-02-09

## 2020-02-04 RX ORDER — ALBUTEROL SULFATE 90 UG/1
2 AEROSOL, METERED RESPIRATORY (INHALATION)
Qty: 1 INHALER | Refills: 0 | Status: SHIPPED | OUTPATIENT
Start: 2020-02-04 | End: 2022-06-23

## 2020-02-04 NOTE — PROGRESS NOTES
HISTORY OF PRESENT ILLNESS  Santa Camacho is a 30 y.o. female. HPI: here for an acute visit. Feeling fever, sore throat, cough since last Saturday. Also feeling chest congestion. No wheezing. No chest pain or sob. Feeling bodyache and not able to get rid of cough or fever so decided to come. Had flu vaccine in oct for this season. Sitting on exam table. Not in an acute distress. No auditory wheezing. Able to talk in full sentence. Not using any extra respiratory muscle  No nausea or vomiting. No abdominal pain. No urinary or bowel complains. Appetite is fair. Visit Vitals  /80 (BP 1 Location: Left arm, BP Patient Position: Sitting)   Pulse (!) 104   Temp 99.3 °F (37.4 °C) (Oral)   Resp 16   Ht 4' 10\" (1.473 m)   Wt 154 lb (69.9 kg)   SpO2 98%   BMI 32.19 kg/m²     Review medication list, vitals, problem list,allergies. Noted mildly elevated blood pressure and HR. Also noted low grade fever. ROS: see HPI     Physical Exam  HENT:      Mouth/Throat:      Comments: Throat: generalize erythema. Tonsillar enlargement but no exudate  Neck; no palpable lymph nodes   Cardiovascular:      Rate and Rhythm: Normal rate and regular rhythm. Pulses: Normal pulses. Heart sounds: Normal heart sounds. Pulmonary:      Effort: No respiratory distress. Breath sounds: No wheezing. Abdominal:      Palpations: Abdomen is soft. Tenderness: There is no abdominal tenderness. Neurological:      Mental Status: She is alert and oriented to person, place, and time. Psychiatric:         Behavior: Behavior normal.         ASSESSMENT and PLAN    ICD-10-CM ICD-9-CM    1. Sore throat: rapid strep and influenza is negative. For now as she is feeling chest congestion and still has fever will order chest x-ray. No urinary complains. Giving azithromycin and albuterol as needed. Also advised motrin with food bid as needed for pain or fever. J02.9 462 AMB POC RAPID STREP A   2.  Cough R05 786.2 AMB POC RAPID INFLUENZA TEST      XR CHEST PA LAT      azithromycin (ZITHROMAX) 250 mg tablet      albuterol (PROVENTIL HFA, VENTOLIN HFA, PROAIR HFA) 90 mcg/actuation inhaler   3. Fever, unspecified fever cause R50.9 780.60 XR CHEST PA LAT      azithromycin (ZITHROMAX) 250 mg tablet   4. Chest congestion R09.89 786.9 azithromycin (ZITHROMAX) 250 mg tablet   5. Elevated blood pressure reading; will observe. R03.0 796.2    Pt understood and agree with the plan     Follow-up and Dispositions    · Return in about 2 days (around 2/6/2020).

## 2020-02-04 NOTE — PROGRESS NOTES
1. Have you been to the ER, urgent care clinic since your last visit? Hospitalized since your last visit? No    2. Have you seen or consulted any other health care providers outside of the [address] [place] [address] since your last visit? Include any pap smears or colon screening. No    Chief Complaint   Patient presents with    Cough     x 5 days - light brown mucus with tinge of blood    Sore Throat     x 5 days    Fever     x 5 days    Decreased Appetite    Letter for School/Work     needs work note for yesterday and today due to missed work - would like to know when she can return to work       Patient had flu vaccine 10/01/19.

## 2020-02-04 NOTE — PATIENT INSTRUCTIONS
Sore Throat: Care Instructions Your Care Instructions Infection by bacteria or a virus causes most sore throats. Cigarette smoke, dry air, air pollution, allergies, and yelling can also cause a sore throat. Sore throats can be painful and annoying. Fortunately, most sore throats go away on their own. If you have a bacterial infection, your doctor may prescribe antibiotics. Follow-up care is a key part of your treatment and safety. Be sure to make and go to all appointments, and call your doctor if you are having problems. It's also a good idea to know your test results and keep a list of the medicines you take. How can you care for yourself at home? · If your doctor prescribed antibiotics, take them as directed. Do not stop taking them just because you feel better. You need to take the full course of antibiotics. · Gargle with warm salt water once an hour to help reduce swelling and relieve discomfort. Use 1 teaspoon of salt mixed in 1 cup of warm water. · Take an over-the-counter pain medicine, such as acetaminophen (Tylenol), ibuprofen (Advil, Motrin), or naproxen (Aleve). Read and follow all instructions on the label. · Be careful when taking over-the-counter cold or flu medicines and Tylenol at the same time. Many of these medicines have acetaminophen, which is Tylenol. Read the labels to make sure that you are not taking more than the recommended dose. Too much acetaminophen (Tylenol) can be harmful. · Drink plenty of fluids. Fluids may help soothe an irritated throat. Hot fluids, such as tea or soup, may help decrease throat pain. · Use over-the-counter throat lozenges to soothe pain. Regular cough drops or hard candy may also help. These should not be given to young children because of the risk of choking. · Do not smoke or allow others to smoke around you. If you need help quitting, talk to your doctor about stop-smoking programs and medicines. These can increase your chances of quitting for good. · Use a vaporizer or humidifier to add moisture to your bedroom. Follow the directions for cleaning the machine. When should you call for help? Call your doctor now or seek immediate medical care if: 
  · You have new or worse trouble swallowing.  
  · Your sore throat gets much worse on one side.  
 Watch closely for changes in your health, and be sure to contact your doctor if you do not get better as expected. Where can you learn more? Go to http://misha-blue.info/. Enter 062 [id number] in the search box to learn more about \"Sore Throat: Care Instructions. \" Current as of: October 21, 2018 Content Version: 12.2 © 9221-8716 [employer]. Care instructions adapted under license by [employer] (which disclaims liability or warranty for this information). If you have questions about a medical condition or this instruction, always ask your healthcare professional. [place] [address] any warranty or liability for your use of this information. Bronchitis: Care Instructions Your Care Instructions Bronchitis is inflammation of the bronchial tubes, which carry air to the lungs. The tubes swell and produce mucus, or phlegm. The mucus and inflamed bronchial tubes make you cough. You may have trouble breathing. Most cases of bronchitis are caused by viruses like those that cause colds. Antibiotics usually do not help and they may be harmful. Bronchitis usually develops rapidly and lasts about 2 to 3 weeks in otherwise healthy people. Follow-up care is a key part of your treatment and safety. Be sure to make and go to all appointments, and call your doctor if you are having problems. It's also a good idea to know your test results and keep a list of the medicines you take. How can you care for yourself at home? · Take all medicines exactly as prescribed.  Call your doctor if you think you are having a problem with your medicine. · Get some extra rest. 
· Take an over-the-counter pain medicine, such as acetaminophen (Tylenol), ibuprofen (Advil, Motrin), or naproxen (Aleve) to reduce fever and relieve body aches. Read and follow all instructions on the label. · Do not take two or more pain medicines at the same time unless the doctor told you to. Many pain medicines have acetaminophen, which is Tylenol. Too much acetaminophen (Tylenol) can be harmful. · Take an over-the-counter cough medicine that contains dextromethorphan to help quiet a dry, hacking cough so that you can sleep. Avoid cough medicines that have more than one active ingredient. Read and follow all instructions on the label. · Breathe moist air from a humidifier, hot shower, or sink filled with hot water. The heat and moisture will thin mucus so you can cough it out. · Do not smoke. Smoking can make bronchitis worse. If you need help quitting, talk to your doctor about stop-smoking programs and medicines. These can increase your chances of quitting for good. When should you call for help? Call 911 anytime you think you may need emergency care. For example, call if: 
  · You have severe trouble breathing.  
 Call your doctor now or seek immediate medical care if: 
  · You have new or worse trouble breathing.  
  · You cough up dark brown or bloody mucus (sputum).  
  · You have a new or higher fever.  
  · You have a new rash.  
 Watch closely for changes in your health, and be sure to contact your doctor if: 
  · You cough more deeply or more often, especially if you notice more mucus or a change in the color of your mucus.  
  · You are not getting better as expected. Where can you learn more? Go to http://misha-blue.info/. Enter H333 in the search box to learn more about \"Bronchitis: Care Instructions. \" Current as of: June 9, 2019 Content Version: 12.2 © 0956-1766 [employer]orporated. Care instructions adapted under license by [employer] (which disclaims liability or warranty for this information). If you have questions about a medical condition or this instruction, always ask your healthcare professional. [address] any warranty or liability for your use of this information.

## 2020-02-04 NOTE — LETTER
NOTIFICATION RETURN TO WORK / SCHOOL 
 
2/4/2020 1:59 PM 
 
Ms. Lela Bella [id number] [address] [address] To Whom It May Concern: 
 
Lela Bella is currently under the care of [facility]. She will return to work/school on: Thursday 2/6/2020 If there are questions or concerns please have the patient contact our office. Sincerely, Billie Quintanilla MD

## 2020-02-05 NOTE — PROGRESS NOTES
Contacted patient and verified identity using name and date of birth (2- identifiers)  Spoke with patient and she verbalized understanding of negative chest xray

## 2020-02-06 ENCOUNTER — OFFICE VISIT (OUTPATIENT)
Dept: FAMILY MEDICINE CLINIC | Age: 25
End: 2020-02-06

## 2020-02-06 VITALS
TEMPERATURE: 99.5 F | HEART RATE: 91 BPM | WEIGHT: 153 LBS | HEIGHT: 58 IN | RESPIRATION RATE: 16 BRPM | OXYGEN SATURATION: 98 % | BODY MASS INDEX: 32.12 KG/M2 | DIASTOLIC BLOOD PRESSURE: 80 MMHG | SYSTOLIC BLOOD PRESSURE: 128 MMHG

## 2020-02-06 DIAGNOSIS — R09.89 CHEST CONGESTION: ICD-10-CM

## 2020-02-06 DIAGNOSIS — R05.9 COUGH: Primary | ICD-10-CM

## 2020-02-06 DIAGNOSIS — R50.9 FEVER, UNSPECIFIED FEVER CAUSE: ICD-10-CM

## 2020-02-06 RX ORDER — PREDNISONE 5 MG/1
TABLET ORAL
Qty: 21 TAB | Refills: 0 | Status: SHIPPED | OUTPATIENT
Start: 2020-02-06 | End: 2020-06-26 | Stop reason: ALTCHOICE

## 2020-02-06 RX ORDER — METHYLPREDNISOLONE ACETATE 40 MG/ML
40 INJECTION, SUSPENSION INTRA-ARTICULAR; INTRALESIONAL; INTRAMUSCULAR; SOFT TISSUE ONCE
Qty: 1 VIAL | Refills: 0
Start: 2020-02-06 | End: 2020-02-06

## 2020-02-06 NOTE — PATIENT INSTRUCTIONS
Bronchitis: Care Instructions  Your Care Instructions    Bronchitis is inflammation of the bronchial tubes, which carry air to the lungs. The tubes swell and produce mucus, or phlegm. The mucus and inflamed bronchial tubes make you cough. You may have trouble breathing. Most cases of bronchitis are caused by viruses like those that cause colds. Antibiotics usually do not help and they may be harmful. Bronchitis usually develops rapidly and lasts about 2 to 3 weeks in otherwise healthy people. Follow-up care is a key part of your treatment and safety. Be sure to make and go to all appointments, and call your doctor if you are having problems. It's also a good idea to know your test results and keep a list of the medicines you take. How can you care for yourself at home? · Take all medicines exactly as prescribed. Call your doctor if you think you are having a problem with your medicine. · Get some extra rest.  · Take an over-the-counter pain medicine, such as acetaminophen (Tylenol), ibuprofen (Advil, Motrin), or naproxen (Aleve) to reduce fever and relieve body aches. Read and follow all instructions on the label. · Do not take two or more pain medicines at the same time unless the doctor told you to. Many pain medicines have acetaminophen, which is Tylenol. Too much acetaminophen (Tylenol) can be harmful. · Take an over-the-counter cough medicine that contains dextromethorphan to help quiet a dry, hacking cough so that you can sleep. Avoid cough medicines that have more than one active ingredient. Read and follow all instructions on the label. · Breathe moist air from a humidifier, hot shower, or sink filled with hot water. The heat and moisture will thin mucus so you can cough it out. · Do not smoke. Smoking can make bronchitis worse. If you need help quitting, talk to your doctor about stop-smoking programs and medicines. These can increase your chances of quitting for good.   When should you call for help? Call 911 anytime you think you may need emergency care. For example, call if:    · You have severe trouble breathing.    Call your doctor now or seek immediate medical care if:    · You have new or worse trouble breathing.     · You cough up dark brown or bloody mucus (sputum).     · You have a new or higher fever.     · You have a new rash.    Watch closely for changes in your health, and be sure to contact your doctor if:    · You cough more deeply or more often, especially if you notice more mucus or a change in the color of your mucus.     · You are not getting better as expected. Where can you learn more? Go to http://misha-blue.info/. Enter H333 in the search box to learn more about \"Bronchitis: Care Instructions. \"  Current as of: June 9, 2019  Content Version: 12.2  © 8714-7591 [employer]. Care instructions adapted under license by [employer] (which disclaims liability or warranty for this information). If you have questions about a medical condition or this instruction, always ask your healthcare professional. [address] any warranty or liability for your use of this information.

## 2020-02-06 NOTE — PROGRESS NOTES
Chief Complaint   Patient presents with    Cough    Shortness of Breath     used albuterol but didn't notice a difference    Other     chest tightness

## 2020-02-06 NOTE — PROGRESS NOTES
HISTORY OF PRESENT ILLNESS  Bonnie Harris is a 30 y.o. female. HPI; here for follow up. Seen couple of days ago. Had fever. Chest congestion, wheezing. She was negative for rapid strep and influenza. She was given albuterol, azithromycin and ordered chest x-ray. Negative chest x-ray for any acute process. Said she is still having cough. Felt little better. It is worse at night time. Wheezing has improved some but still when has cough spell it gets worse. No chest pain or sob. Sitting comfortable. Not in an acute distress. Able to talk in full sentence. Not using any extra respiratory muscle . Mild expiratory wheeze. No nausea or vomiting. No abdominal pain. No sore throat. No urinary or bowel complains. Reminded her to use probiotic. She still has low grade fever. Excuse from work for now as still symptomatic. Does not smoke. Had flu shot . No h/o asthma or copd. 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   Also no recent travel history. Appetite is fair. No weight loss. No urinary frequency, urgency or burning. No pelvic pain . Visit Vitals  /80 (BP 1 Location: Left arm, BP Patient Position: Sitting)   Pulse 91   Temp 99.5 °F (37.5 °C) (Oral)   Resp 16   Ht 4' 10\" (1.473 m)   Wt 153 lb (69.4 kg)   SpO2 98%   BMI 31.98 kg/m²         ROS : see HPI   Physical Exam  Cardiovascular:      Rate and Rhythm: Normal rate and regular rhythm. Pulmonary:      Effort: Pulmonary effort is normal.      Breath sounds: Wheezing (mild expiratory wheezing ) present. Abdominal:      Palpations: Abdomen is soft. Tenderness: There is no abdominal tenderness. Neurological:      Mental Status: She is alert and oriented to person, place, and time. ASSESSMENT and PLAN    ICD-10-CM ICD-9-CM    1. Cough: for now given solumedrol injection at the office. Start prednisone from tomorrow as still having chest congestion. Wheezing. Continue azithromycin. Probiotic. tylenol as needed for fever or pain. F/u next visit. Albuterol as needed for chest congestion. R05 786.2 predniSONE (STERAPRED) 5 mg dose pack   2. Chest congestion R09.89 786.9 predniSONE (STERAPRED) 5 mg dose pack   3. Fever, unspecified fever cause R50.9 780.60 predniSONE (STERAPRED) 5 mg dose pack   Pt understood and agree with the plan   Follow-up and Dispositions    · Return for monday .

## 2020-02-10 ENCOUNTER — OFFICE VISIT (OUTPATIENT)
Dept: FAMILY MEDICINE CLINIC | Age: 25
End: 2020-02-10

## 2020-02-10 VITALS
HEIGHT: 58 IN | RESPIRATION RATE: 16 BRPM | SYSTOLIC BLOOD PRESSURE: 130 MMHG | OXYGEN SATURATION: 98 % | DIASTOLIC BLOOD PRESSURE: 80 MMHG | BODY MASS INDEX: 32.12 KG/M2 | HEART RATE: 99 BPM | TEMPERATURE: 99.1 F | WEIGHT: 153 LBS

## 2020-02-10 DIAGNOSIS — R06.2 WHEEZING: ICD-10-CM

## 2020-02-10 DIAGNOSIS — R05.9 COUGH: Primary | ICD-10-CM

## 2020-02-10 NOTE — PATIENT INSTRUCTIONS
Bronchitis: Care Instructions  Your Care Instructions    Bronchitis is inflammation of the bronchial tubes, which carry air to the lungs. The tubes swell and produce mucus, or phlegm. The mucus and inflamed bronchial tubes make you cough. You may have trouble breathing. Most cases of bronchitis are caused by viruses like those that cause colds. Antibiotics usually do not help and they may be harmful. Bronchitis usually develops rapidly and lasts about 2 to 3 weeks in otherwise healthy people. Follow-up care is a key part of your treatment and safety. Be sure to make and go to all appointments, and call your doctor if you are having problems. It's also a good idea to know your test results and keep a list of the medicines you take. How can you care for yourself at home? · Take all medicines exactly as prescribed. Call your doctor if you think you are having a problem with your medicine. · Get some extra rest.  · Take an over-the-counter pain medicine, such as acetaminophen (Tylenol), ibuprofen (Advil, Motrin), or naproxen (Aleve) to reduce fever and relieve body aches. Read and follow all instructions on the label. · Do not take two or more pain medicines at the same time unless the doctor told you to. Many pain medicines have acetaminophen, which is Tylenol. Too much acetaminophen (Tylenol) can be harmful. · Take an over-the-counter cough medicine that contains dextromethorphan to help quiet a dry, hacking cough so that you can sleep. Avoid cough medicines that have more than one active ingredient. Read and follow all instructions on the label. · Breathe moist air from a humidifier, hot shower, or sink filled with hot water. The heat and moisture will thin mucus so you can cough it out. · Do not smoke. Smoking can make bronchitis worse. If you need help quitting, talk to your doctor about stop-smoking programs and medicines. These can increase your chances of quitting for good.   When should you call for help? Call 911 anytime you think you may need emergency care. For example, call if:    · You have severe trouble breathing.    Call your doctor now or seek immediate medical care if:    · You have new or worse trouble breathing.     · You cough up dark brown or bloody mucus (sputum).     · You have a new or higher fever.     · You have a new rash.    Watch closely for changes in your health, and be sure to contact your doctor if:    · You cough more deeply or more often, especially if you notice more mucus or a change in the color of your mucus.     · You are not getting better as expected. Where can you learn more? Go to http://misha-blue.info/. Enter H333 in the search box to learn more about \"Bronchitis: Care Instructions. \"  Current as of: June 9, 2019  Content Version: 12.2  © 5229-7543 [employer], Incorporated. Care instructions adapted under license by [employer] (which disclaims liability or warranty for this information). If you have questions about a medical condition or this instruction, always ask your healthcare professional. [address] any warranty or liability for your use of this information.

## 2020-02-10 NOTE — PROGRESS NOTES
HISTORY OF PRESENT ILLNESS  Perlita Farrell is a 30 y.o. female. HPI: Here for follow up . Been having cough, wheezing, chest congestion since last more than couple of weeks. Also was having fever. Negative strep and rapid influenza. She was given azithromycin, albuterol. Helped some and still was wheezing. Negative chest x-ray. She was given oral prednisone last visit. Feeling much better. Today temp around 99. Said no fever at home. It is around 98. No wheezing. Cough has improved significantly and mainly dry. No sob or chest pain. No palpitation or diaphoresis. No nausea or vomiting. No abdominal pain. No urinary or bowel complains. No headache or dizziness, no appetite or weight changes. No side effects of antibiotic or prednisone. Visit Vitals  /80 (BP 1 Location: Left arm, BP Patient Position: Sitting)   Pulse 99   Temp 99.1 °F (37.3 °C) (Oral)   Resp 16   Ht 4' 10\" (1.473 m)   Wt 153 lb (69.4 kg)   SpO2 98%   BMI 31.98 kg/m²     Review medication list, vitals, problem list,allergies. XR Results (most recent):  Results from Hospital Encounter encounter on 02/04/20   XR CHEST PA LAT    Narrative CHEST PA AND LATERAL:    CPT CODE: 07628    COMPARISON: 3/25/2019    INDICATION: Cough and congestion since Saturday. Low-grade fever. FINDINGS: Heart and mediastinum are normal. Lungs are clear with no infiltrates. No pleural effusions are seen. No significant osseous abnormalities. Impression Impression:    Normal study similar to 3/25/2019. ROS: see HPI     Physical Exam  Cardiovascular:      Rate and Rhythm: Normal rate and regular rhythm. Pulmonary:      Effort: Pulmonary effort is normal. No respiratory distress. Breath sounds: No wheezing. Musculoskeletal:         General: No swelling. Neurological:      Mental Status: She is alert and oriented to person, place, and time. ASSESSMENT and PLAN    ICD-10-CM ICD-9-CM    1.  Cough: for now it improved significantly. No more fever at home . Wheezing and chest congestion improved. Will observe. Given letter to return to work. Negative chest -xray. See last two visits note for further detail. No travel history. Said one of the employee was coughing and after that few people  Called out with similar symptoms. R05 786.2    2. Wheezing R06.2 786.07    Pt understood and agree with the plan     Follow-up and Dispositions    · Return for as scheduled .

## 2020-02-10 NOTE — PROGRESS NOTES
Chief Complaint   Patient presents with    Cough    Other     Congestion     1. Have you been to the ER, urgent care clinic since your last visit? Hospitalized since your last visit? No    2. Have you seen or consulted any other health care providers outside of the [address] [place] [address] since your last visit? Include any pap smears or colon screening.  No

## 2020-02-10 NOTE — LETTER
NOTIFICATION RETURN TO WORK / SCHOOL 
 
2/10/2020 3:27 PM 
 
Ms. Willow Olivera [id number] [place] [id number] To Whom It May Concern: 
 
Willow Olivera is currently under the care of [facility]. She will return to work/school on: Tuesday 2/11/2020 without restrictions. If there are questions or concerns please have the patient contact our office. Sincerely, Bautista Weber MD

## 2020-06-24 ENCOUNTER — TELEPHONE (OUTPATIENT)
Dept: FAMILY MEDICINE CLINIC | Age: 25
End: 2020-06-24

## 2020-06-24 NOTE — TELEPHONE ENCOUNTER
Have you been diagnosed with, tested for, or told that you are suspected of having COVID-19 (coronovirus)? No   Have you had a fever or taken medication to treat a fever in the past 72 hours? No   Have you had a cough, SOB, or flu-like symptoms within the past 3 days? No   Have you had direct contact with someone who tested positive for COVID-19 within the past 14 days? No   Do you have a household member with flu-like symptoms, including fever, cough, or SOB? No   Do you currently have flu-like symptoms including fever, cough, or SOB?  No

## 2020-06-26 ENCOUNTER — OFFICE VISIT (OUTPATIENT)
Dept: FAMILY MEDICINE CLINIC | Age: 25
End: 2020-06-26

## 2020-06-26 VITALS
TEMPERATURE: 98.8 F | SYSTOLIC BLOOD PRESSURE: 122 MMHG | RESPIRATION RATE: 18 BRPM | DIASTOLIC BLOOD PRESSURE: 68 MMHG | BODY MASS INDEX: 31.91 KG/M2 | HEART RATE: 77 BPM | HEIGHT: 58 IN | WEIGHT: 152 LBS | OXYGEN SATURATION: 98 %

## 2020-06-26 DIAGNOSIS — Z00.00 WELL WOMAN EXAM (NO GYNECOLOGICAL EXAM): ICD-10-CM

## 2020-06-26 DIAGNOSIS — Z23 ENCOUNTER FOR IMMUNIZATION: Primary | ICD-10-CM

## 2020-06-26 NOTE — PROGRESS NOTES
1. Have you been to the ER, urgent care clinic since your last visit? Hospitalized since your last visit? No    2. Have you seen or consulted any other health care providers outside of the [address] [place] [address] since your last visit? Include any pap smears or colon screening. No       Have you been diagnosed with, tested for, or told that you are suspected of having COVID-19 (coronovirus)? No  Have you had a fever or taken medication to treat a fever in the past 72 hours? No  Have you had a cough, SOB, or flu-like symptoms within the past 3 days? No  Have you had direct contact with someone who tested positive for COVID-19 within the past 14 days? No  Do you have a household member with flu-like symptoms, including fever, cough, or SOB? No  Do you currently have flu-like symptoms including fever, cough, or SOB? No      Physician order obtained. Patient completed adult immunization consent form. Allergies, contraindications and recommendations reviewed with patient. HPV vaccine administered IM left deltoid. Patient tolerated well. Patient remained in office for 15 minutes after injection and no adverse reactions were noted.       HPV/Gardasil  Lot #  Exp Date:  [address] #

## 2020-06-26 NOTE — PROGRESS NOTES
Subjective:   30 y.o. female for Well Woman Check. Never had any Pap smear done. Said not sexually active and does not want to start getting Pap yet. Discussed the recommendations with the age for cervical cancer screening. She understood but declined a Pap smear. Discussed the HPV vaccine and she agrees to take it. Denies any pelvic pain or vaginal discharge. Regular menstrual cycle. No concern. No family history of breast cancer, cervical cancer, ovarian cancer or colon cancer. Sitting comfortably during the visit without any acute distress. No concern on self breast exam.  Declined clinical breast exam  Patient Active Problem List    Diagnosis Date Noted    Vitamin D deficiency 08/03/2018    Cough due to bronchospasm 03/12/2014    AR (allergic rhinitis) 03/12/2014     Current Outpatient Medications   Medication Sig Dispense Refill    ascorbic acid/multivit-min (EMERGEN-C PO) Take  by mouth.  cyanocobalamin, vitamin B-12, (VITAMIN B-12 PO) Take  by mouth.  albuterol (PROVENTIL HFA, VENTOLIN HFA, PROAIR HFA) 90 mcg/actuation inhaler Take 2 Puffs by inhalation every six (6) hours as needed for Wheezing. 1 Inhaler 0    acetaminophen (TYLENOL EXTRA STRENGTH) 500 mg tablet Take 1,000 mg by mouth every six (6) hours as needed for Pain.  cholecalciferol, vitamin D3, (VITAMIN D3) 2,000 unit tab Take 2,000 Units by mouth daily.  multivitamin (ONE A DAY) tablet Take 1 Tab by mouth daily. No Known Allergies  History reviewed. No pertinent past medical history.   Past Surgical History:   Procedure Laterality Date    HX WISDOM TEETH EXTRACTION Bilateral 2012    MD INCISIONAL BIOPSY SKIN SINGLE LESION N/A 04/11/2019    Dr. Nata Hubbard     Family History   Problem Relation Age of Onset    Hypertension Mother    [email or web address] Elevated Lipids Mother     Arthritis-osteo Mother     Arthritis-rheumatoid Maternal Grandmother     Stroke Maternal Grandmother     Hypertension Maternal Grandmother     Hypertension Paternal Grandmother     Hypertension Father    [facility] Elevated Lipids Father     Arthritis-osteo Father      Social History     Tobacco Use    Smoking status: Never Smoker    Smokeless tobacco: Never Used   Substance Use Topics    Alcohol use: No        Lab Results   Component Value Date/Time    WBC 8.0 03/25/2019 11:00 AM    HGB 13.2 03/25/2019 11:00 AM    HCT 38.7 03/25/2019 11:00 AM    PLATELET 052 73/70/1140 11:00 AM    MCV 94.2 03/25/2019 11:00 AM     Lab Results   Component Value Date/Time    Sodium 139 03/25/2019 11:00 AM    Potassium 4.1 03/25/2019 11:00 AM    Chloride 108 03/25/2019 11:00 AM    CO2 26 03/25/2019 11:00 AM    Anion gap 5 03/25/2019 11:00 AM    Glucose 91 03/25/2019 11:00 AM    BUN 13 03/25/2019 11:00 AM    Creatinine 0.58 (L) 03/25/2019 11:00 AM    BUN/Creatinine ratio 22 (H) 03/25/2019 11:00 AM    GFR est AA >60 03/25/2019 11:00 AM    GFR est non-AA >60 03/25/2019 11:00 AM    Calcium 9.0 03/25/2019 11:00 AM    Bilirubin, total 0.4 08/03/2018 08:00 AM    Alk. phosphatase 56 08/03/2018 08:00 AM    Protein, total 7.5 08/03/2018 08:00 AM    Albumin 3.9 08/03/2018 08:00 AM    Globulin 3.6 08/03/2018 08:00 AM    A-G Ratio 1.1 08/03/2018 08:00 AM    ALT (SGPT) 24 08/03/2018 08:00 AM    AST (SGOT) 12 (L) 08/03/2018 08:00 AM     Lab Results   Component Value Date/Time    Cholesterol, total 141 08/03/2018 08:00 AM    HDL Cholesterol 49 08/03/2018 08:00 AM    LDL, calculated 79.2 08/03/2018 08:00 AM    VLDL, calculated 12.8 08/03/2018 08:00 AM    Triglyceride 64 08/03/2018 08:00 AM    CHOL/HDL Ratio 2.9 08/03/2018 08:00 AM     Lab Results   Component Value Date/Time    TSH 1.36 08/03/2018 08:00 AM     Lab Results   Component Value Date/Time    Hemoglobin A1c 5.4 08/03/2018 08:00 AM     Lab Results   Component Value Date/Time    Vitamin D 25-Hydroxy 52.2 11/20/2018 09:04 AM           ROS: Feeling generally well. No TIA's or unusual headaches, no dysphagia. No prolonged cough.  No dyspnea or chest pain on exertion. No abdominal pain, change in bowel habits, black or bloody stools. No urinary tract symptoms. No new or unusual musculoskeletal symptoms. Specific concerns today: None    Objective: The patient appears well, alert, oriented x 3, in no distress. Visit Vitals  /68 (BP 1 Location: Right arm, BP Patient Position: Sitting)   Pulse 77   Temp 98.8 °F (37.1 °C) (Oral)   Resp 18   Ht 4' 10\" (1.473 m)   Wt 152 lb (68.9 kg)   LMP 05/29/2020   SpO2 98%   BMI 31.77 kg/m²     ENT normal.  Neck supple. No adenopathy or thyromegaly. ARLEY. Lungs are clear, good air entry, no wheezes, rhonchi or rales. S1 and S2 normal, no murmurs, regular rate and rhythm. Abdomen soft without tenderness, guarding, mass or organomegaly. Extremities show no edema, normal peripheral pulses. Neurological is normal, no focal findings. Breast and Pelvic exams are deferred. Assessment/Plan:       ICD-10-CM ICD-9-CM    1. Encounter for immunization Z23 V03.89 HUMAN PAPILLOMA VIRUS (HPV) VACCINE, TYPES 6, 11, 16, 18 (QUADRIVALENT), 3 DOSE SCHED., IM      AK IMMUNIZ ADMIN,1 SINGLE/COMB VAC/TOXOID   2. Well woman exam (no gynecological exam) Z00.00 V70.0     [V70.0]   Patient understood and agree with above plan.   Review health maintenance  Follow-up and Dispositions    · Return in about 1 year (around 6/26/2021) for annual physical.

## 2020-07-28 ENCOUNTER — TELEPHONE (OUTPATIENT)
Dept: FAMILY MEDICINE CLINIC | Age: 25
End: 2020-07-28

## 2020-07-28 NOTE — TELEPHONE ENCOUNTER
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 I did speak with Dr. Lawson Olmedo and she did indicate that it would be best to postpone to vaccine for at least 2 weeks due to exposure. Patient was advised that as soon as she has 14 days without exposure to Covid + patients to call the office to schedule NV for vaccine. I did advised that this delay would not have any effect on the efficacy of the vaccine series. Patient verbalized understanding.

## 2020-07-28 NOTE — TELEPHONE ENCOUNTER
Pt has been exposed to covid at her job. Appointment for 7/29 was cancelled, pt did not understand why it had to be cancelled. Please advise.

## 2020-08-21 ENCOUNTER — TELEPHONE (OUTPATIENT)
Dept: FAMILY MEDICINE CLINIC | Age: 25
End: 2020-08-21

## 2020-08-21 ENCOUNTER — CLINICAL SUPPORT (OUTPATIENT)
Dept: FAMILY MEDICINE CLINIC | Age: 25
End: 2020-08-21

## 2020-08-21 DIAGNOSIS — Z23 NEED FOR HPV VACCINATION: Primary | ICD-10-CM

## 2020-08-21 NOTE — PROGRESS NOTES
Chief Complaint   Patient presents with    Immunization/Injection     2nd HPV     Patient presents for the following vaccines: 2nd HPV. Patient consent obtained by patient. Patient tolerated procedure well at right deltoid. Patient advised to remain in lobby for period of 10 minutes post injection to ensure no reaction.        Lot# [id number]  Exp: 01/29/2022  CHRISTINA:Oneal  NDC: 8185-0978-15

## 2020-08-21 NOTE — PATIENT INSTRUCTIONS
HPV (Human Papillomavirus) Vaccine: What You Need to Know Why get vaccinated? HPV (Human papillomavirus) vaccine can prevent infection with some types of human papillomavirus. HPV infections can cause certain types of cancers including: · cervical, vaginal and vulvar cancers in women, 
· penile cancer in men, and 
· anal cancers in both men and women. HPV vaccine prevents infection from the HPV types that cause over 90% of these cancers. HPV is spread through intimate skin-to-skin or sexual contact. HPV infections are so common that nearly all men and women will get at least one type of HPV at some time in their lives. Most HPV infections go away by themselves within 2 years. But sometimes HPV infections will last longer and can cause cancers later in life. HPV vaccine HPV vaccine is routinely recommended for adolescents at 6or 15years of age to ensure they are protected before they are exposed to the virus. HPV vaccine may be given beginning at age 5 years, and as late as age 39 years. Most people older than 26 years will not benefit from HPV vaccination. Talk with your health care provider if you want more information. Most children who get the first dose before 13years of age need 2 doses of HPV vaccine. Anyone who gets the first dose on or after 13years of age, and younger people with certain immunocompromising conditions, need 3 doses. Your health care provider can give you more information. HPV vaccine may be given at the same time as other vaccines. Talk with your health care provider Tell your vaccine provider if the person getting the vaccine: 
· Has had an allergic reaction after a previous dose of HPV vaccine, or has any severe, life-threatening allergies. · Is pregnant. In some cases, your health care provider may decide to postpone HPV vaccination to a future visit. People with minor illnesses, such as a cold, may be vaccinated.  People who are moderately or severely ill should usually wait until they recover before getting HPV vaccine. Your health care provider can give you more information. Risks of a vaccine reaction · Soreness, redness, or swelling where the shot is given can happen after HPV vaccine. · Fever or headache can happen after HPV vaccine. People sometimes faint after medical procedures, including vaccination. Tell your provider if you feel dizzy or have vision changes or ringing in the ears. As with any medicine, there is a very remote chance of a vaccine causing a severe allergic reaction, other serious injury, or death. What if there is a serious problem? An allergic reaction could occur after the vaccinated person leaves the clinic. If you see signs of a severe allergic reaction (hives, swelling of the face and throat, difficulty breathing, a fast heartbeat, dizziness, or weakness), call 9-1-1 and get the person to the nearest hospital. 
For other signs that concern you, call your health care provider. Adverse reactions should be reported to the Vaccine Adverse Event Reporting System (VAERS). Your health care provider will usually file this report, or you can do it yourself. Visit the VAERS website at www.vaers. hhs.gov or call 3-519.542.8828. VAERS is only for reporting reactions, and VAERS staff do not give medical advice. The National Vaccine Injury Compensation Program 
The National Vaccine Injury Compensation Program (VICP) is a federal program that was created to compensate people who may have been injured by certain vaccines. Visit the VICP website at www.hrsa.gov/vaccinecompensation or call 3-800.471.2341 to learn about the program and about filing a claim. There is a time limit to file a claim for compensation. How can I learn more? · Ask your health care provider. · Call your local or state health department.  
· Contact the Centers for Disease Control and Prevention (CDC): 
 ? Call 4-750.485.4299 (1-800-CDC-INFO) or 
? Visit CDC's website at www.cdc.gov/vaccines Vaccine Information Statement (Interim) HPV Vaccine 10/30/2019 
42 UKelly Griffin [id number] Department of [facility] and [employer] Centers for Disease Control and Prevention Many Vaccine Information Statements are available in Portuguese and other languages. See www.immunize.org/vis. Hojas de Información Sobre Vacunas están disponibles en español y en muchos otros idiomas. Visite Monika.si. Care instructions adapted under license by [employer] (which disclaims liability or warranty for this information).  If you have questions about a medical condition or this instruction, always ask your healthcare professional. [address] any warranty or liability for your use of this information.

## 2020-11-30 ENCOUNTER — VIRTUAL VISIT (OUTPATIENT)
Dept: FAMILY MEDICINE CLINIC | Age: 25
End: 2020-11-30
Payer: COMMERCIAL

## 2020-11-30 DIAGNOSIS — R07.9 CHEST PAIN, UNSPECIFIED TYPE: ICD-10-CM

## 2020-11-30 DIAGNOSIS — R07.9 CHEST PAIN, UNSPECIFIED TYPE: Primary | ICD-10-CM

## 2020-11-30 PROCEDURE — 99213 OFFICE O/P EST LOW 20 MIN: CPT | Performed by: FAMILY MEDICINE

## 2020-12-01 ENCOUNTER — HOSPITAL ENCOUNTER (OUTPATIENT)
Dept: LAB | Age: 25
Discharge: HOME | End: 2020-12-01
Payer: COMMERCIAL

## 2020-12-01 PROCEDURE — 93005 ELECTROCARDIOGRAM TRACING: CPT

## 2020-12-02 LAB
ATRIAL RATE: 80 BPM
CALCULATED P AXIS, ECG09: 18 DEGREES
CALCULATED R AXIS, ECG10: 57 DEGREES
CALCULATED T AXIS, ECG11: 51 DEGREES
DIAGNOSIS, 93000: NORMAL
P-R INTERVAL, ECG05: 120 MS
Q-T INTERVAL, ECG07: 394 MS
QRS DURATION, ECG06: 82 MS
QTC CALCULATION (BEZET), ECG08: 454 MS
VENTRICULAR RATE, ECG03: 80 BPM

## 2020-12-03 NOTE — PROGRESS NOTES
Contacted patient and verified identity using name and date of birth (2- identifiers)  Spoke with patient and she verbalized understanding of no acute changes on EKG

## 2020-12-10 ENCOUNTER — TELEPHONE (OUTPATIENT)
Dept: FAMILY MEDICINE CLINIC | Age: 25
End: 2020-12-10

## 2020-12-11 ENCOUNTER — CLINICAL SUPPORT (OUTPATIENT)
Dept: FAMILY MEDICINE CLINIC | Age: 25
End: 2020-12-11
Payer: COMMERCIAL

## 2020-12-11 DIAGNOSIS — Z23 NEED FOR HPV VACCINATION: Primary | ICD-10-CM

## 2020-12-11 PROCEDURE — 99211 OFF/OP EST MAY X REQ PHY/QHP: CPT | Performed by: FAMILY MEDICINE

## 2020-12-11 PROCEDURE — 90651 9VHPV VACCINE 2/3 DOSE IM: CPT | Performed by: FAMILY MEDICINE

## 2020-12-11 NOTE — PROGRESS NOTES
Chief Complaint   Patient presents with    Immunization/Injection     Patient presents for 3rd HPV vaccine.  Tolerated procedure well at right deltoid    Lot # [id number]  Exp: 08/25/22  NDC: 4576-4639-83  [employer]

## 2020-12-14 ENCOUNTER — OFFICE VISIT (OUTPATIENT)
Dept: FAMILY MEDICINE CLINIC | Age: 25
End: 2020-12-14
Payer: COMMERCIAL

## 2020-12-14 ENCOUNTER — HOSPITAL ENCOUNTER (OUTPATIENT)
Dept: LAB | Age: 25
Discharge: HOME | End: 2020-12-14
Payer: COMMERCIAL

## 2020-12-14 VITALS
OXYGEN SATURATION: 98 % | WEIGHT: 152.6 LBS | HEIGHT: 58 IN | DIASTOLIC BLOOD PRESSURE: 82 MMHG | TEMPERATURE: 98.4 F | HEART RATE: 81 BPM | BODY MASS INDEX: 32.03 KG/M2 | RESPIRATION RATE: 16 BRPM | SYSTOLIC BLOOD PRESSURE: 120 MMHG

## 2020-12-14 DIAGNOSIS — F41.9 ANXIETY: Primary | ICD-10-CM

## 2020-12-14 DIAGNOSIS — Z13.31 POSITIVE DEPRESSION SCREENING: ICD-10-CM

## 2020-12-14 DIAGNOSIS — R00.2 PALPITATION: ICD-10-CM

## 2020-12-14 DIAGNOSIS — R07.9 CHEST PAIN, UNSPECIFIED TYPE: ICD-10-CM

## 2020-12-14 LAB
ALBUMIN SERPL-MCNC: 3.8 G/DL (ref 3.4–5)
ALBUMIN/GLOB SERPL: 1.2 {RATIO} (ref 0.8–1.7)
ALP SERPL-CCNC: 77 U/L (ref 45–117)
ALT SERPL-CCNC: 32 U/L (ref 13–56)
ANION GAP SERPL CALC-SCNC: 5 MMOL/L (ref 3–18)
AST SERPL-CCNC: 13 U/L (ref 10–38)
BILIRUB SERPL-MCNC: 0.3 MG/DL (ref 0.2–1)
BUN SERPL-MCNC: 14 MG/DL (ref 7–18)
BUN/CREAT SERPL: 22 (ref 12–20)
CALCIUM SERPL-MCNC: 9.1 MG/DL (ref 8.5–10.1)
CHLORIDE SERPL-SCNC: 106 MMOL/L (ref 100–111)
CO2 SERPL-SCNC: 29 MMOL/L (ref 21–32)
CREAT SERPL-MCNC: 0.64 MG/DL (ref 0.6–1.3)
ERYTHROCYTE [DISTWIDTH] IN BLOOD BY AUTOMATED COUNT: 12.1 % (ref 11.6–14.5)
GLOBULIN SER CALC-MCNC: 3.3 G/DL (ref 2–4)
GLUCOSE SERPL-MCNC: 89 MG/DL (ref 74–99)
HCT VFR BLD AUTO: 39.3 % (ref 35–45)
HGB BLD-MCNC: 13.4 G/DL (ref 12–16)
MCH RBC QN AUTO: 32.1 PG (ref 24–34)
MCHC RBC AUTO-ENTMCNC: 34.1 G/DL (ref 31–37)
MCV RBC AUTO: 94 FL (ref 74–97)
PLATELET # BLD AUTO: 407 K/UL (ref 135–420)
PMV BLD AUTO: 9.9 FL (ref 9.2–11.8)
POTASSIUM SERPL-SCNC: 4.1 MMOL/L (ref 3.5–5.5)
PROT SERPL-MCNC: 7.1 G/DL (ref 6.4–8.2)
RBC # BLD AUTO: 4.18 M/UL (ref 4.2–5.3)
SODIUM SERPL-SCNC: 140 MMOL/L (ref 136–145)
TSH SERPL DL<=0.05 MIU/L-ACNC: 2.73 UIU/ML (ref 0.36–3.74)
WBC # BLD AUTO: 8.7 K/UL (ref 4.6–13.2)

## 2020-12-14 PROCEDURE — 84443 ASSAY THYROID STIM HORMONE: CPT

## 2020-12-14 PROCEDURE — 85027 COMPLETE CBC AUTOMATED: CPT

## 2020-12-14 PROCEDURE — 36415 COLL VENOUS BLD VENIPUNCTURE: CPT

## 2020-12-14 PROCEDURE — 80053 COMPREHEN METABOLIC PANEL: CPT

## 2020-12-14 PROCEDURE — 99214 OFFICE O/P EST MOD 30 MIN: CPT | Performed by: FAMILY MEDICINE

## 2020-12-14 RX ORDER — VENLAFAXINE HYDROCHLORIDE 37.5 MG/1
37.5 CAPSULE, EXTENDED RELEASE ORAL DAILY
Qty: 30 CAP | Refills: 1 | Status: SHIPPED | OUTPATIENT
Start: 2020-12-14 | End: 2021-01-22

## 2020-12-14 RX ORDER — IBUPROFEN 200 MG
200 TABLET ORAL
COMMUNITY
End: 2022-06-23

## 2020-12-14 NOTE — PATIENT INSTRUCTIONS
Chest Pain: Care Instructions  Your Care Instructions     There are many things that can cause chest pain. Some are not serious and will get better on their own in a few days. But some kinds of chest pain need more testing and treatment. Your doctor may have recommended a follow-up visit in the next 8 to 12 hours. If you are not getting better, you may need more tests or treatment. Even though your doctor has released you, you still need to watch for any problems. The doctor carefully checked you, but sometimes problems can develop later. If you have new symptoms or if your symptoms do not get better, get medical care right away. If you have worse or different chest pain or pressure that lasts more than 5 minutes or you passed out (lost consciousness), call 911 or seek other emergency help right away. A medical visit is only one step in your treatment. Even if you feel better, you still need to do what your doctor recommends, such as going to all suggested follow-up appointments and taking medicines exactly as directed. This will help you recover and help prevent future problems. How can you care for yourself at home? · Rest until you feel better. · Take your medicine exactly as prescribed. Call your doctor if you think you are having a problem with your medicine. · Do not drive after taking a prescription pain medicine. When should you call for help? Call 911 if:     · You passed out (lost consciousness).     · You have severe difficulty breathing.     · You have symptoms of a heart attack. These may include:  ? Chest pain or pressure, or a strange feeling in your chest.  ? Sweating. ? Shortness of breath. ? Nausea or vomiting. ? Pain, pressure, or a strange feeling in your back, neck, jaw, or upper belly or in one or both shoulders or arms. ? Lightheadedness or sudden weakness. ? A fast or irregular heartbeat.   After you call 911, the  may tell you to chew 1 adult-strength or 2 to 4 low-dose aspirin. Wait for an ambulance. Do not try to drive yourself. Call your doctor today if:     · You have any trouble breathing.     · Your chest pain gets worse.     · You are dizzy or lightheaded, or you feel like you may faint.     · You are not getting better as expected.     · You are having new or different chest pain. Where can you learn more? Go to http://www.jacob.com/  Enter A120 in the search box to learn more about \"Chest Pain: Care Instructions. \"  Current as of: June 26, 2019               Content Version: 12.6  © 9810-4470 [employer]. Care instructions adapted under license by [employer] (which disclaims liability or warranty for this information). If you have questions about a medical condition or this instruction, always ask your healthcare professional. [address] any warranty or liability for your use of this information. Palpitations: Care Instructions  Your Care Instructions     Heart palpitations are the uncomfortable sensation that your heart is beating fast or irregularly. You might feel pounding or fluttering in your chest. It might feel like your heart is skipping a beat. Although palpitations may be caused by a heart problem, they also occur because of stress, fatigue, or use of alcohol, caffeine, or nicotine. Many medicines, including diet pills, antihistamines, decongestants, and some herbal products, can cause heart palpitations. Nearly everyone has palpitations from time to time. Depending on your symptoms, your doctor may need to do more tests to try to find the cause of your palpitations. Follow-up care is a key part of your treatment and safety. Be sure to make and go to all appointments, and call your doctor if you are having problems. It's also a good idea to know your test results and keep a list of the medicines you take. How can you care for yourself at home?   · Avoid caffeine, nicotine, and excess alcohol. · Do not take illegal drugs, such as methamphetamines and cocaine. · Do not take weight loss or diet medicines unless you talk with your doctor first.  · Get plenty of sleep. · Do not overeat. · If you have palpitations again, take deep breaths and try to relax. This may slow a racing heart. · If you start to feel lightheaded, lie down to avoid injuries that might result if you pass out and fall down. · Keep a record of your palpitations and bring it to your next doctor's appointment. Write down:  ? The date and time. ? Your pulse. (If your heart is beating fast, it may be hard to count your pulse.)  ? What you were doing when the palpitations started. ? How long the palpitations lasted. ? Any other symptoms. · If an activity causes palpitations, slow down or stop. Talk to your doctor before you do that activity again. · Take your medicines exactly as prescribed. Call your doctor if you think you are having a problem with your medicine. When should you call for help? Call 911 anytime you think you may need emergency care. For example, call if:    · You passed out (lost consciousness).     · You have symptoms of a heart attack. These may include:  ? Chest pain or pressure, or a strange feeling in the chest.  ? Sweating. ? Shortness of breath. ? Pain, pressure, or a strange feeling in the back, neck, jaw, or upper belly or in one or both shoulders or arms. ? Lightheadedness or sudden weakness. ? A fast or irregular heartbeat. After you call 911, the  may tell you to chew 1 adult-strength or 2 to 4 low-dose aspirin. Wait for an ambulance. Do not try to drive yourself.     · You have symptoms of a stroke. These may include:  ? Sudden numbness, tingling, weakness, or loss of movement in your face, arm, or leg, especially on only one side of your body. ? Sudden vision changes. ? Sudden trouble speaking.   ? Sudden confusion or trouble understanding simple statements. ? Sudden problems with walking or balance. ? A sudden, severe headache that is different from past headaches. Call your doctor now or seek immediate medical care if:    · You have heart palpitations and:  ? Are dizzy or lightheaded, or you feel like you may faint. ? Have new or increased shortness of breath. Watch closely for changes in your health, and be sure to contact your doctor if:    · You continue to have heart palpitations. Where can you learn more? Go to http://www.gray.com/  Enter R508 in the search box to learn more about \"Palpitations: Care Instructions. \"  Current as of: December 16, 2019               Content Version: 12.6  © 1301-9514 [employer]. Care instructions adapted under license by [employer] (which disclaims liability or warranty for this information). If you have questions about a medical condition or this instruction, always ask your healthcare professional. [place] [address] any warranty or liability for your use of this information. Learning about Antidepressants  What are antidepressants? Antidepressants are medicines that change the levels of some chemicals in the brain. They can help affect moods. There are different types that work on brain chemicals in different ways. These medicines can help treat depression. They are also used for anxiety, eating disorders, post-traumatic stress disorder, and chronic pain. What are some examples? There are many different types of antidepressant medicines. They include:  · Selective serotonin reuptake inhibitors (SSRIs). Some examples include citalopram, fluoxetine, and sertraline. · Serotonin and norepinephrine reuptake inhibitors (SNRIs). Some examples include duloxetine and venlafaxine. · Atypical antidepressants. Some examples include bupropion, mirtazapine, and trazodone. · Tricyclic and tetracyclic antidepressants.  Some examples include amitriptyline and nortriptyline. · Monoamine oxidase inhibitors (MAOIs). An example includes selegiline. What are the side effects? Side effects may vary. They depend on the medicine you take. But common ones include:  · Nausea. · Dry mouth. · Loss of appetite. · Diarrhea or constipation. · Sexual problems. (These may include loss of desire or erection problems.)  · Headaches. · Trouble falling asleep. Or you may wake up a lot at night. · Weight gain. · Feeling nervous or on edge. · Feeling drowsy in the daytime. Problems with sexual arousal and a lack of interest in sex are common side effects. If this happens to you, talk to your doctor. There are other medicines that may help with these problems. Mild side effects may go away after you take the medicine for a few weeks. If the side effects bother you, talk with your doctor. He or she may prescribe a different medicine. Or the doctor may suggest ways to manage your side effects. How do you take antidepressants safely? If your doctor has prescribed antidepressants, here are some tips to help you get the most from your medicine. · Share your health history with your doctor. Tell your doctor about your other medicines and health conditions. This information can affect which antidepressant your doctor prescribes for you. Tell your doctor if you or anyone in your family has had bipolar disorder or used antidepressants before. · Take your medicine as prescribed. It works best and has fewer side effects when you take it exactly as your doctor prescribed. If you miss a dose, and if it's not too late in the day, it's okay to take it. Don't double up doses. · Keep taking your medicine for a while. Taking it for at least 6 months after you feel better can help keep you from getting symptoms again. · Be prepared to try different medicines and doses. You may start to feel better within 1 to 3 weeks after you start the medicine.  If you have not improved at all in 3 weeks, your doctor may increase your dose. Or you may need to try a different medicine. Over time, your doctor may need to adjust your dose again to manage your symptoms better. · Don't take any new medicines unless you talk to your doctor first.   Even common medicines like aspirin and some vitamins and herbs can cause problems if you use them while you take antidepressants. · Do not drink alcohol. It can make the side effects worse. · Don't stop taking your medicine on your own. Quitting antidepressants too quickly can cause withdrawal symptoms. It can also cause depression to return. If you are having a problem with your medicine or are ready to quit taking it, work with your doctor. He or she can help you to slowly reduce the dose over a span of a few weeks. · Call your doctor right away for serious side effects. Examples include:  ? Warning signs of suicide. These include talking or writing about death, giving away belongings, or withdrawing from family and friends. ? Manic behavior. This includes having very high energy, sleeping less than normal, being impulsive, or being grouchy or restless. ? Serotonin syndrome. This can happen if you take too much antidepressant medicine or take more than one type of medicine that affects serotonin. Signs may include high body temperature, clumsiness, nausea, or feeling restless. How might you feel about taking antidepressants? You may feel nervous, relieved, or a little surprised that your doctor is talking to you about antidepressants. And the thought of needing to take medicine for a long time can be scary. But whether you are depressed or you have another condition, you are taking a step to help yourself feel better. Follow-up care is a key part of your treatment and safety. Be sure to make and go to all appointments, and call your doctor if you are having problems.  It's also a good idea to know your test results and keep a list of the medicines you take. Where can you learn more? Go to http://www.gray.com/  Enter A230 in the search box to learn more about \"Learning about Antidepressants. \"  Current as of: January 31, 2020               Content Version: 12.6  © 2166-1745 [employer]. Care instructions adapted under license by [employer] (which disclaims liability or warranty for this information). If you have questions about a medical condition or this instruction, always ask your healthcare professional. [place] [address] any warranty or liability for your use of this information.

## 2020-12-14 NOTE — PROGRESS NOTES
1. Have you been to the ER, urgent care clinic since your last visit? Hospitalized since your last visit? No    2. Have you seen or consulted any other health care providers outside of the [address] [place] [address] since your last visit? Include any pap smears or colon screening.  No    Chief Complaint   Patient presents with    Anxiety

## 2020-12-14 NOTE — PROGRESS NOTES
HISTORY OF PRESENT ILLNESS  Guillermo Quintanilla is a 30 y.o. female. HPI: Here for follow up on recurrent episode of chest pain and palpitatoin. Occurred second time at work. Said first time she had a busy shift but this time was regular shift and did not felt stressed out at all. Did not feel anxious. Said she is not sure she feels depressed or not as she is still doing ok at work and at home. Able to sleep good. Appetite is fair. No thoughts of hurting herself or others. No panic attacks. Said initial blood pressure was 137/90 and . Repeat after 10 minutes 122/86 and HR 90  Checked at home after 4 hours of sleep 116/86 Hr 92. Today sitting comfortable but said she is just tiered. No known thyroid problem. She had EKG done after first episode and showed no acute process. She has been eating on time and drinking more fluid. Trying to cut down on caffeine. Said episode last for 10-15 minutes. Feels left side chest discomfort but no pain. Able to talk in full sentence. No sob. No cough or cold or wheezing. No fever. During visit asymptomatic. Visit Vitals  /82 (BP 1 Location: Left arm, BP Patient Position: Sitting)   Pulse 81   Temp 98.4 °F (36.9 °C) (Oral)   Resp 16   Ht 4' 10\" (1.473 m)   Wt 152 lb 9.6 oz (69.2 kg)   SpO2 98%   BMI 31.89 kg/m²   Review medication list, vitals, problem list,allergies. ROS: no headache or dizziness, no vision changes. No diaphoresis. No nausea or vomiting or abdominal pain. No urinary or bowel complains. No heat or cold intolerance. Sleep is fair. Appetite and weight has been stable. Physical Exam  Constitutional:       General: She is not in acute distress. Neck:      Musculoskeletal: Neck supple. Cardiovascular:      Rate and Rhythm: Normal rate and regular rhythm. Heart sounds: Normal heart sounds. Abdominal:      General: Bowel sounds are normal.      Palpations: Abdomen is soft. Tenderness:  There is no abdominal tenderness. Musculoskeletal:         General: No swelling. Lymphadenopathy:      Cervical: No cervical adenopathy. Neurological:      Mental Status: She is oriented to person, place, and time. Psychiatric:         Behavior: Behavior normal.         Thought Content: Thought content normal.         Judgment: Judgment normal.         ASSESSMENT and PLAN    ICD-10-CM ICD-9-CM    1. Anxiety  F41.9 300.00 REFERRAL TO PSYCHIATRY      venlafaxine-SR (EFFEXOR-XR) 37.5 mg capsule   2. Positive depression screening : for now agree to give trial to effexor. Discussed in detail about positive screening. Also advised to call work help line and see if she can set up for counseling. Discussed medication side effects. Agree to see psychiatrist and possible counseling. Z13.31 796.4 REFERRAL TO PSYCHIATRY      venlafaxine-SR (EFFEXOR-XR) 37.5 mg capsule   3. Chest pain, unspecified type : episodic . Occurred twice. EKG showed no acute process. Associated with palpitation. Advised to cut down on caffeine and also sending to cardiology for further recommendations. R07.9 786.50 REFERRAL TO CARDIOLOGY   4. Palpitation  R00.2 785.1 REFERRAL TO CARDIOLOGY      CBC W/O DIFF      METABOLIC PANEL, COMPREHENSIVE      TSH 3RD GENERATION   Pt understood and agree with the plan     Follow-up and Dispositions    · Return in about 1 month (around 1/14/2021).

## 2021-01-22 ENCOUNTER — VIRTUAL VISIT (OUTPATIENT)
Dept: FAMILY MEDICINE CLINIC | Age: 26
End: 2021-01-22
Payer: COMMERCIAL

## 2021-01-22 DIAGNOSIS — R00.2 PALPITATION: ICD-10-CM

## 2021-01-22 DIAGNOSIS — R05.9 COUGH: ICD-10-CM

## 2021-01-22 DIAGNOSIS — F41.9 ANXIETY: Primary | ICD-10-CM

## 2021-01-22 DIAGNOSIS — R07.9 CHEST PAIN, UNSPECIFIED TYPE: ICD-10-CM

## 2021-01-22 PROCEDURE — 99213 OFFICE O/P EST LOW 20 MIN: CPT | Performed by: FAMILY MEDICINE

## 2021-01-22 NOTE — PROGRESS NOTES
Ryanne Roche is a 30 y.o. female who was seen by synchronous (real-time) audio-video technology on 1/22/2021 for Anxiety and Chest Pain        Assessment & Plan:   Diagnoses and all orders for this visit:    Anxiety: Also depression screening questionnaire were positive. Also feeling stressed out at work and brings to chest discomfort. Was given Effexor but worried about side effects or not started it. She has started counseling with the better health program at work. She was advised to call the provided number for the specialist/psychiatrist to schedule an appointment. Patient agrees to do that    Comments:  not taken effexor. not heard from psychology yet. using work \" better health \"       Chest pain, unspecified type: Has a coming up cardiology appointment  later this month. EKG showed no acute changes. Will follow specialist recommendation  Comments:  coming up appt with cardiology     Palpitation: Resolved at this time    Cough: Advised albuterol as needed. Mainly at nighttime. Chest x-ray negative back in February. Will observe at this time. No fever or any wheezing. No shortness of breath or diaphoresis. She has taken her first dose of Covid vaccine on last Saturday  Comments:  more at night and when it is cold     Patient understood and agreed with the plan  She never had a Pap smear done she was advised to schedule the appointment for physical and Pap 712  Subjective:     Done visit with tamara. Here for follow up . Last visit she was feeling anxiety and chest discomfort. See more details on her last visit note. currently denies any palpitation but does have on and off chest discomfort. Said when she feels stressed at work mainly she feels chest discomfort. Last for short time and improves on its own or with the rest.  No shortness of breath or diaphoresis associated with it. Sometimes on the left side of the chest and sometimes on the right.   She has pending appointment with the cardiology coming up later on this month. During the visit she was sitting comfortable and did not appear in any acute distress. Depression screening was positive during the last visit and in upon asking she was feeling anxious and started Effexor. She was worried about side effect so has not started it yet. She has started counseling from the work and it has been helping at this time. I have encouraged to make an appointment with the specialist/psychiatrist at the provided number during the last discharge. She agrees with it. During the visit she was sitting comfortable and did not appear in any acute distress  No headache or dizziness. No nausea or vomiting. No chest pain during the visit or any shortness of breath. No abdominal pain. No urinary or bowel complaint. No appetite or weight changes. Mood has been stable. Sleep is fair. No known thyroid problem. Menstrual cycle regular  Last EKG done after the first episode and showed no acute process. Currently she is having a mild cough mainly dry. Said mainly at nighttime when she feels cold. No wheezing. Again no shortness of breath or any chest is comfort. No fever. She had taken her first Covid vaccine last Saturday  Audible wheezing.   Able to talk in full sentence and did not appear in any acute respiratory distress  Lab Results   Component Value Date/Time    WBC 8.7 12/14/2020 12:41 PM    HGB 13.4 12/14/2020 12:41 PM    HCT 39.3 12/14/2020 12:41 PM    PLATELET 146 62/39/8891 12:41 PM    MCV 94.0 12/14/2020 12:41 PM     Lab Results   Component Value Date/Time    Sodium 140 12/14/2020 12:41 PM    Potassium 4.1 12/14/2020 12:41 PM    Chloride 106 12/14/2020 12:41 PM    CO2 29 12/14/2020 12:41 PM    Anion gap 5 12/14/2020 12:41 PM    Glucose 89 12/14/2020 12:41 PM    BUN 14 12/14/2020 12:41 PM    Creatinine 0.64 12/14/2020 12:41 PM    BUN/Creatinine ratio 22 (H) 12/14/2020 12:41 PM    GFR est AA >60 12/14/2020 12:41 PM    GFR est non-AA >60 12/14/2020 12:41 PM    Calcium 9.1 12/14/2020 12:41 PM    Bilirubin, total 0.3 12/14/2020 12:41 PM    Alk. phosphatase 77 12/14/2020 12:41 PM    Protein, total 7.1 12/14/2020 12:41 PM    Albumin 3.8 12/14/2020 12:41 PM    Globulin 3.3 12/14/2020 12:41 PM    A-G Ratio 1.2 12/14/2020 12:41 PM    ALT (SGPT) 32 12/14/2020 12:41 PM    AST (SGOT) 13 12/14/2020 12:41 PM     Lab Results   Component Value Date/Time    Cholesterol, total 141 08/03/2018 08:00 AM    HDL Cholesterol 49 08/03/2018 08:00 AM    LDL, calculated 79.2 08/03/2018 08:00 AM    VLDL, calculated 12.8 08/03/2018 08:00 AM    Triglyceride 64 08/03/2018 08:00 AM    CHOL/HDL Ratio 2.9 08/03/2018 08:00 AM     Lab Results   Component Value Date/Time    TSH 2.73 12/14/2020 12:41 PM     Lab Results   Component Value Date/Time    Hemoglobin A1c 5.4 08/03/2018 08:00 AM     Lab Results   Component Value Date/Time    Vitamin D 25-Hydroxy 52.2 11/20/2018 09:04 AM           Prior to Admission medications    Medication Sig Start Date End Date Taking? Authorizing Provider   ibuprofen (AdviL) 200 mg tablet Take 200 mg by mouth daily as needed for Pain. Yes Provider, Historical   cyanocobalamin, vitamin B-12, (VITAMIN B-12 PO) Take  by mouth. Yes Provider, Historical   cholecalciferol, vitamin D3, (VITAMIN D3) 2,000 unit tab Take 2,000 Units by mouth daily. Yes Provider, Historical   multivitamin (ONE A DAY) tablet Take 1 Tab by mouth daily. Yes Provider, Historical   venlafaxine-SR (EFFEXOR-XR) 37.5 mg capsule Take 1 Cap by mouth daily. 12/14/20 1/22/21  Ana Lilia Gomez MD   albuterol (PROVENTIL HFA, VENTOLIN HFA, PROAIR HFA) 90 mcg/actuation inhaler Take 2 Puffs by inhalation every six (6) hours as needed for Wheezing. 2/4/20   Ana Lilia Gomez MD   acetaminophen (TYLENOL EXTRA STRENGTH) 500 mg tablet Take 1,000 mg by mouth every six (6) hours as needed for Pain.     Provider, Historical     Patient Active Problem List    Diagnosis Date Noted    Vitamin D deficiency 08/03/2018    Cough due to bronchospasm 03/12/2014    AR (allergic rhinitis) 03/12/2014     Current Outpatient Medications   Medication Sig Dispense Refill    ibuprofen (AdviL) 200 mg tablet Take 200 mg by mouth daily as needed for Pain.  cyanocobalamin, vitamin B-12, (VITAMIN B-12 PO) Take  by mouth.  cholecalciferol, vitamin D3, (VITAMIN D3) 2,000 unit tab Take 2,000 Units by mouth daily.  multivitamin (ONE A DAY) tablet Take 1 Tab by mouth daily.  albuterol (PROVENTIL HFA, VENTOLIN HFA, PROAIR HFA) 90 mcg/actuation inhaler Take 2 Puffs by inhalation every six (6) hours as needed for Wheezing. 1 Inhaler 0    acetaminophen (TYLENOL EXTRA STRENGTH) 500 mg tablet Take 1,000 mg by mouth every six (6) hours as needed for Pain. No Known Allergies  No past medical history on file. Social History     Tobacco Use    Smoking status: Never Smoker    Smokeless tobacco: Never Used   Substance Use Topics    Alcohol use: No       ROS: see HPI     Objective:     Patient-Reported Vitals 1/22/2021   Patient-Reported LMP 1/1/2021      General: alert, cooperative, no distress   Mental  status: normal mood, behavior, speech, dress, motor activity, and thought processes, able to follow commands   HENT: NCAT   Neck: no visualized mass   Resp: no respiratory distress   Neuro: no gross deficits   Skin: no discoloration or lesions of concern on visible areas   Psychiatric: normal affect, consistent with stated mood, no evidence of hallucinations     Additional exam findings: We discussed the expected course, resolution and complications of the diagnosis(es) in detail. Medication risks, benefits, costs, interactions, and alternatives were discussed as indicated. I advised her to contact the office if her condition worsens, changes or fails to improve as anticipated. She expressed understanding with the diagnosis(es) and plan.        Debbrah Hamman, who was evaluated through a patient-initiated, synchronous (real-time) audio-video encounter, and/or her healthcare decision maker, is aware that it is a billable service, with coverage as determined by her insurance carrier. She provided verbal consent to proceed: Yes, and patient identification was verified. It was conducted pursuant to the emergency declaration under the [address] [place] [address] and the [employer]. A caregiver was present when appropriate. Ability to conduct physical exam was limited. I was in the office. The patient was at home.       Nir Pitts MD

## 2021-01-27 ENCOUNTER — OFFICE VISIT (OUTPATIENT)
Dept: CARDIOLOGY CLINIC | Age: 26
End: 2021-01-27
Payer: COMMERCIAL

## 2021-01-27 VITALS
DIASTOLIC BLOOD PRESSURE: 60 MMHG | SYSTOLIC BLOOD PRESSURE: 124 MMHG | HEIGHT: 58 IN | OXYGEN SATURATION: 96 % | WEIGHT: 155 LBS | BODY MASS INDEX: 32.54 KG/M2 | HEART RATE: 108 BPM

## 2021-01-27 DIAGNOSIS — R07.9 CHEST PAIN, UNSPECIFIED TYPE: Primary | ICD-10-CM

## 2021-01-27 DIAGNOSIS — R07.9 CHEST PAIN, UNSPECIFIED TYPE: ICD-10-CM

## 2021-01-27 PROCEDURE — 99204 OFFICE O/P NEW MOD 45 MIN: CPT | Performed by: INTERNAL MEDICINE

## 2021-01-27 NOTE — PROGRESS NOTES
Rand Weldon    Chief Complaint   Patient presents with    New Patient     referred by PCP for chest pain and palpitations        HPI    Rand Weldon is a 30 y.o. 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 This typically happens when she is at work she is usually really kind of at the end of her shift and quite tired but she says she is exerting herself and she will feel kind of a dull pressure in the left side of her chest.  She feels palpitation she can feel short of breath and winded. It sounds like it really started back in November and she said it happened so bad she actually got scared and she called out of work. She admits she did not go to the ER or have any further evaluation at the time did not have her vitals checked or anything. It is persistently been happening since and just not quite sure what is going on and quite nervous about it. He does try to exercise she does 30 minutes of Pilates or weights maybe 1-2 times a week but it seems like it is sporadic and she cannot really say if it is happening every time she is doing that. She has never been pregnant/ has no kids, lives at home with her parents. No known FH for premature CAD but her has an enlarged heart and hypertension, her grandmother had a stroke she has a younger brother who is also had cardiac testing but she says ruled out and just had palpitations. CV RFs: none    No past medical history on file.     Past Surgical History:   Procedure Laterality Date    HX WISDOM TEETH EXTRACTION Bilateral 2012    MN INCISIONAL BIOPSY SKIN SINGLE LESION N/A 04/11/2019    Dr. Latrice Ramirez       Current Outpatient Medications   Medication Sig Dispense Refill    ibuprofen (AdviL) 200 mg tablet Take 200 mg by mouth daily as needed for Pain.  cyanocobalamin, vitamin B-12, (VITAMIN B-12 PO) Take  by mouth.  albuterol (PROVENTIL HFA, VENTOLIN HFA, PROAIR HFA) 90 mcg/actuation inhaler Take 2 Puffs by inhalation every six (6) hours as needed for Wheezing. 1 Inhaler 0    acetaminophen (TYLENOL EXTRA STRENGTH) 500 mg tablet Take 1,000 mg by mouth every six (6) hours as needed for Pain.  cholecalciferol, vitamin D3, (VITAMIN D3) 2,000 unit tab Take 2,000 Units by mouth daily.  multivitamin (ONE A DAY) tablet Take 1 Tab by mouth daily.          No Known Allergies    Social History     Socioeconomic History    Marital status: SINGLE     Spouse name: Not on file    Number of children: Not on file    Years of education: Not on file    Highest education level: Not on file   Occupational History    Occupation: Nurse   Social Needs    Financial resource strain: Not on file    Food insecurity     Worry: Not on file     Inability: Not on file   [employer] needs     Medical: Not on file     Non-medical: Not on file   Tobacco Use    Smoking status: Never Smoker    Smokeless tobacco: Never Used   Substance and Sexual Activity    Alcohol use: No    Drug use: No    Sexual activity: Never   Lifestyle    Physical activity     Days per week: Not on file     Minutes per session: Not on file    Stress: Not on file   Relationships    Social connections     Talks on phone: Not on file     Gets together: Not on file     Attends [employer] service: Not on file     Active member of club or organization: Not on file     Attends meetings of clubs or organizations: Not on file     Relationship status: Not on file    Intimate partner violence     Fear of current or ex partner: Not on file     Emotionally abused: Not on file     Physically abused: Not on file     Forced sexual activity: Not on file   Other Topics Concern    Not on file   Social History Narrative    Not on file        FH: see HPI    Review of Systems    14 pt Review of Systems is negative unless otherwise mentioned in the HPI. Wt Readings from Last 3 Encounters:   01/27/21 70.3 kg (155 lb)   12/14/20 69.2 kg (152 lb 9.6 oz)   06/26/20 68.9 kg (152 lb)     Temp Readings from Last 3 Encounters:   12/14/20 98.4 °F (36.9 °C) (Oral)   06/26/20 98.8 °F (37.1 °C) (Oral)   02/10/20 99.1 °F (37.3 °C) (Oral)     BP Readings from Last 3 Encounters:   01/27/21 124/60   12/14/20 120/82   06/26/20 122/68     Pulse Readings from Last 3 Encounters:   01/27/21 (!) 108   12/14/20 81   06/26/20 77     Physical Exam:    Visit Vitals  /60 (BP 1 Location: Left arm, BP Patient Position: Sitting)   Pulse (!) 108   Ht 4' 10\" (1.473 m)   Wt 70.3 kg (155 lb)   SpO2 96%   BMI 32.40 kg/m²      Physical Exam  HENT:      Head: Normocephalic and atraumatic. Eyes:      Pupils: Pupils are equal, round, and reactive to light. Cardiovascular:      Rate and Rhythm: Normal rate and regular rhythm. Heart sounds: Normal heart sounds. No murmur. No friction rub. No gallop. Comments: No TTP  Pulmonary:      Effort: Pulmonary effort is normal. No respiratory distress. Breath sounds: Normal breath sounds. No wheezing or rales. Chest:      Chest wall: No tenderness. Abdominal:      General: Bowel sounds are normal.      Palpations: Abdomen is soft. Musculoskeletal:         General: No tenderness. Skin:     General: Skin is warm and dry. Neurological:      Mental Status: She is alert and oriented to person, place, and time.          EKG 12/1/21: NSR, normal axis and intervals, no ST segment abnormalities    Lab Results   Component Value Date/Time    Sodium 140 12/14/2020 12:41 PM    Potassium 4.1 12/14/2020 12:41 PM    Chloride 106 12/14/2020 12:41 PM    CO2 29 12/14/2020 12:41 PM    Anion gap 5 12/14/2020 12:41 PM    Glucose 89 12/14/2020 12:41 PM    BUN 14 12/14/2020 12:41 PM    Creatinine 0.64 12/14/2020 12:41 PM    BUN/Creatinine ratio 22 (H) 12/14/2020 12:41 PM    GFR est AA >60 12/14/2020 12:41 PM    GFR est non-AA >60 12/14/2020 12:41 PM    Calcium 9.1 12/14/2020 12:41 PM    Bilirubin, total 0.3 12/14/2020 12:41 PM    Alk. phosphatase 77 12/14/2020 12:41 PM    Protein, total 7.1 12/14/2020 12:41 PM    Albumin 3.8 12/14/2020 12:41 PM    Globulin 3.3 12/14/2020 12:41 PM    A-G Ratio 1.2 12/14/2020 12:41 PM    ALT (SGPT) 32 12/14/2020 12:41 PM    AST (SGOT) 13 12/14/2020 12:41 PM     Lab Results   Component Value Date/Time    WBC 8.7 12/14/2020 12:41 PM    HGB 13.4 12/14/2020 12:41 PM    HCT 39.3 12/14/2020 12:41 PM    PLATELET 172 39/23/0773 12:41 PM    MCV 94.0 12/14/2020 12:41 PM     Lab Results   Component Value Date/Time    TSH 2.73 12/14/2020 12:41 PM     Lab Results   Component Value Date/Time    Hemoglobin A1c 5.4 08/03/2018 08:00 AM     Lab Results   Component Value Date/Time    Cholesterol, total 141 08/03/2018 08:00 AM    HDL Cholesterol 49 08/03/2018 08:00 AM    LDL, calculated 79.2 08/03/2018 08:00 AM    VLDL, calculated 12.8 08/03/2018 08:00 AM    Triglyceride 64 08/03/2018 08:00 AM    CHOL/HDL Ratio 2.9 08/03/2018 08:00 AM         Impression and Plan:  Elton Jimenes is a 30 y.o. with:    1.) CP with exertion, persistent  2.) Palpitations/ tachycardia  3.) KOHLER    1.) Labs obtained and reviewed  2.) EKG independently reviewed and NSR, but sinus tach in my office today  3.) Due to persistent exertional symptoms will pursue stress echo, will call with result and if WNL- should follow up with PCP     Thank you for allowing me to participate in the care of your patient, please do not hesitate to call with questions or concerns.     [address],    Leigh Dalton DO

## 2021-01-27 NOTE — PROGRESS NOTES
Chad Hitchcock presents today for   Chief Complaint   Patient presents with    New Patient     referred by PCP for chest pain and palpitations        Chad Hitchcock preferred language for health care discussion is english/other. Is someone accompanying this pt? no    Is the patient using any DME equipment during [address]? no    Depression Screening:  3 most recent PHQ Screens 12/14/2020   Little interest or pleasure in doing things More than half the days   Feeling down, depressed, irritable, or hopeless More than half the days   Total Score PHQ 2 4   Trouble falling or staying asleep, or sleeping too much Not at all   Feeling tired or having little energy More than half the days   Poor appetite, weight loss, or overeating Not at all   Feeling bad about yourself - or that you are a failure or have let yourself or your family down Nearly every day   Trouble concentrating on things such as school, work, reading, or watching TV Several days   Moving or speaking so slowly that other people could have noticed; or the opposite being so fidgety that others notice Several days   Thoughts of being better off dead, or hurting yourself in some way Not at all   PHQ 9 Score 11   How difficult have these problems made it for you to do your work, take care of your home and get along with others Not difficult at all       Learning Assessment:  Learning Assessment 12/14/2020   PRIMARY LEARNER Patient   HIGHEST LEVEL OF EDUCATION - PRIMARY LEARNER  2 YEARS [address] CAREGIVER No   PRIMARY LANGUAGE ENGLISH    NEED No   LEARNER PREFERENCE PRIMARY DEMONSTRATION     LISTENING     READING     -   LEARNING SPECIAL TOPICS No   ANSWERED BY patient   RELATIONSHIP SELF       Abuse Screening:  Abuse Screening Questionnaire 12/14/2020   Do you ever feel afraid of your partner? N   Are you in a relationship with someone who physically or mentally threatens you?  N   Is it safe for you to go home? Y       Fall Risk  No flowsheet data found. Pt currently taking Anticoagulant therapy? no    Coordination of Care:  1. Have you been to the ER, urgent care clinic since your last visit? Hospitalized since your last visit? no    2. Have you seen or consulted any other health care providers outside of the [address] [place] [address] since your last visit? Include any pap smears or colon screening.  no

## 2021-02-16 ENCOUNTER — TELEPHONE (OUTPATIENT)
Dept: CARDIOLOGY CLINIC | Age: 26
End: 2021-02-16

## 2021-02-18 LAB
STRESS ANGINA INDEX: 0
STRESS ESTIMATED WORKLOAD: 10 METS
STRESS EXERCISE DUR MIN: NORMAL MIN:SEC
STRESS SR DUKE TREADMILL SCORE: 9
STRESS ST DEPRESSION: 0 MM
STRESS ST ELEVATION: 0 MM
STRESS TARGET HR: 193 BPM

## 2021-02-22 NOTE — PROGRESS NOTES
Per your last note\" Haider Baig is a 30 y.o. with:     1.) CP with exertion, persistent  2.) Palpitations/ tachycardia  3.) KOHLER     1.) Labs obtained and reviewed  2.) EKG independently reviewed and NSR, but sinus tach in my office today  3.) Due to persistent exertional symptoms will pursue stress echo, will call with result and if WNL- should follow up with PCP      Thank you for allowing me to participate in the care of your patient, please do not hesitate to call with questions or concerns.

## 2021-02-25 ENCOUNTER — TELEPHONE (OUTPATIENT)
Dept: CARDIOLOGY CLINIC | Age: 26
End: 2021-02-25

## 2021-02-25 NOTE — TELEPHONE ENCOUNTER
----- Message from Christofer Duffy DO sent at 2/23/2021  2:45 PM EST -----  Her stress test was normal/ low risk which is really reassuring  Please follow up with your primary care  ----- Message -----  From: Eduardo Dee LPN  Sent: 8/30/5088   9:25 AM EST  To: Christofer Duffy DO    Per your last note\" Bjorn Wright is a 30 y.o. with:     1.) CP with exertion, persistent  2.) Palpitations/ tachycardia  3.) KOHLER     1.) Labs obtained and reviewed  2.) EKG independently reviewed and NSR, but sinus tach in my office today  3.) Due to persistent exertional symptoms will pursue stress echo, will call with result and if WNL- should follow up with PCP      Thank you for allowing me to participate in the care of your patient, please do not hesitate to call with questions or concerns.

## 2021-03-22 ENCOUNTER — VIRTUAL VISIT (OUTPATIENT)
Dept: FAMILY MEDICINE CLINIC | Age: 26
End: 2021-03-22
Payer: COMMERCIAL

## 2021-03-22 DIAGNOSIS — R07.89 CHEST DISCOMFORT: ICD-10-CM

## 2021-03-22 DIAGNOSIS — F41.9 ANXIETY: Primary | ICD-10-CM

## 2021-03-22 PROCEDURE — 99213 OFFICE O/P EST LOW 20 MIN: CPT | Performed by: FAMILY MEDICINE

## 2021-03-22 NOTE — PROGRESS NOTES
Edson Martel is a 30 y.o. female who was seen by synchronous (real-time) audio-video technology on 3/22/2021 for Other (Anxiety, chest discomfort)        Assessment & Plan:   Diagnoses and all orders for this visit:    Anxiety: More when she feels stressed out. Had a chest discomfort and palpitation. Gradually condition is improving. Still waiting for psychiatric referral appointment. Nurse to advised to help her to get the appointment. Has given Effexor but they are worried about side effect so does not want any medication. Not in acute distress. For exertional chest discomfort, palpitation she was seen by cardiology and stress echo negative. For now will observe. Reviewed cardiology consultations and stress echo results  Comments:  did not want effexor as was worreid for anxiety     Chest discomfort: See above  Comments:  due to chest pain, palpitation. seen cardio. negative stress echo. will observe. Patient understood and agreed with the plan  Advised that needs completion for Pap. She does not want to schedule it at this time as she felt she is uncomfortable doing that at this time. Please note that this dictation was completed with [employer], the [employer] voice recognition software. Quite often unanticipated grammatical, syntax, homophones, and other interpretive errors are inadvertently transcribed by the computer software. Please disregard these errors. Please excuse any errors that have escaped final proofreading. [id number]  Subjective:   Done visit through doxy  Here for feeling anxious and stressed out. Said the chest discomfort and exertional chest discomfort feeling only when she feels stressed out. On and off palpitation. Overall all symptoms are improving gradually. She was given trial of Effexor but has not taken it is worried for her side effects. Also seen cardiology. A stress echo been negative. At this time sitting comfortable and did not appear in any acute distress. No chest pain or shortness of breath. No headache or dizziness. No appetite or weight changes. No nausea vomiting or any urinary or bowel complaint. Sleep is fair. Does night shift so that has disturbed some but fair no concern from patient. Lab Results   Component Value Date/Time    WBC 8.7 12/14/2020 12:41 PM    HGB 13.4 12/14/2020 12:41 PM    HCT 39.3 12/14/2020 12:41 PM    PLATELET 492 90/19/3645 12:41 PM    MCV 94.0 12/14/2020 12:41 PM     Lab Results   Component Value Date/Time    Sodium 140 12/14/2020 12:41 PM    Potassium 4.1 12/14/2020 12:41 PM    Chloride 106 12/14/2020 12:41 PM    CO2 29 12/14/2020 12:41 PM    Anion gap 5 12/14/2020 12:41 PM    Glucose 89 12/14/2020 12:41 PM    BUN 14 12/14/2020 12:41 PM    Creatinine 0.64 12/14/2020 12:41 PM    BUN/Creatinine ratio 22 (H) 12/14/2020 12:41 PM    GFR est AA >60 12/14/2020 12:41 PM    GFR est non-AA >60 12/14/2020 12:41 PM    Calcium 9.1 12/14/2020 12:41 PM    Bilirubin, total 0.3 12/14/2020 12:41 PM    Alk. phosphatase 77 12/14/2020 12:41 PM    Protein, total 7.1 12/14/2020 12:41 PM    Albumin 3.8 12/14/2020 12:41 PM    Globulin 3.3 12/14/2020 12:41 PM    A-G Ratio 1.2 12/14/2020 12:41 PM    ALT (SGPT) 32 12/14/2020 12:41 PM    AST (SGOT) 13 12/14/2020 12:41 PM     Lab Results   Component Value Date/Time    Cholesterol, total 141 08/03/2018 08:00 AM    HDL Cholesterol 49 08/03/2018 08:00 AM    LDL, calculated 79.2 08/03/2018 08:00 AM    VLDL, calculated 12.8 08/03/2018 08:00 AM    Triglyceride 64 08/03/2018 08:00 AM    CHOL/HDL Ratio 2.9 08/03/2018 08:00 AM     Lab Results   Component Value Date/Time    TSH 2.73 12/14/2020 12:41 PM     Lab Results   Component Value Date/Time    Hemoglobin A1c 5.4 08/03/2018 08:00 AM     Lab Results   Component Value Date/Time    Vitamin D 25-Hydroxy 52.2 11/20/2018 09:04 AM         Prior to Admission medications    Medication Sig Start Date End Date Taking?  Authorizing Provider   cyanocobalamin, vitamin B-12, (VITAMIN B-12 PO) Take  by mouth. Yes Provider, Historical   cholecalciferol, vitamin D3, (VITAMIN D3) 2,000 unit tab Take 2,000 Units by mouth daily. Yes Provider, Historical   multivitamin (ONE A DAY) tablet Take 1 Tab by mouth daily. Yes Provider, Historical   ibuprofen (AdviL) 200 mg tablet Take 200 mg by mouth daily as needed for Pain. Provider, Historical   albuterol (PROVENTIL HFA, VENTOLIN HFA, PROAIR HFA) 90 mcg/actuation inhaler Take 2 Puffs by inhalation every six (6) hours as needed for Wheezing. 2/4/20   Emiliana Gonzales MD   acetaminophen (TYLENOL EXTRA STRENGTH) 500 mg tablet Take 1,000 mg by mouth every six (6) hours as needed for Pain. Provider, Historical     Patient Active Problem List    Diagnosis Date Noted    Vitamin D deficiency 08/03/2018    Cough due to bronchospasm 03/12/2014    AR (allergic rhinitis) 03/12/2014     Current Outpatient Medications   Medication Sig Dispense Refill    cyanocobalamin, vitamin B-12, (VITAMIN B-12 PO) Take  by mouth.  cholecalciferol, vitamin D3, (VITAMIN D3) 2,000 unit tab Take 2,000 Units by mouth daily.  multivitamin (ONE A DAY) tablet Take 1 Tab by mouth daily.  ibuprofen (AdviL) 200 mg tablet Take 200 mg by mouth daily as needed for Pain.  albuterol (PROVENTIL HFA, VENTOLIN HFA, PROAIR HFA) 90 mcg/actuation inhaler Take 2 Puffs by inhalation every six (6) hours as needed for Wheezing. 1 Inhaler 0    acetaminophen (TYLENOL EXTRA STRENGTH) 500 mg tablet Take 1,000 mg by mouth every six (6) hours as needed for Pain. No Known Allergies  No past medical history on file.   Social History     Tobacco Use    Smoking status: Never Smoker    Smokeless tobacco: Never Used   Substance Use Topics    Alcohol use: No     ROS : See HPI     Objective:     Patient-Reported Vitals 1/22/2021   Patient-Reported LMP 1/1/2021      General: alert, cooperative, no distress   Mental  status: normal mood, behavior, speech, dress, motor activity, and thought processes, able to follow commands   HENT: NCAT   Neck: no visualized mass   Resp: no respiratory distress   Neuro: no gross deficits   Skin: no discoloration or lesions of concern on visible areas   Psychiatric: normal affect, consistent with stated mood, no evidence of hallucinations     Additional exam findings: We discussed the expected course, resolution and complications of the diagnosis(es) in detail. Medication risks, benefits, costs, interactions, and alternatives were discussed as indicated. I advised her to contact the office if her condition worsens, changes or fails to improve as anticipated. She expressed understanding with the diagnosis(es) and plan. Edson Screen, was evaluated through a synchronous (real-time) audio-video encounter. The patient (or guardian if applicable) is aware that this is a billable service. Verbal consent to proceed has been obtained within the past 12 months. The visit was conducted pursuant to the emergency declaration under the [address] [place] [address] authority and the [employer] and [employer] General Act. Patient identification was verified, and a caregiver was present when appropriate. The patient was located in a state where the provider was credentialed to provide care.     Tracee Short MD

## 2021-06-29 ENCOUNTER — OFFICE VISIT (OUTPATIENT)
Dept: FAMILY MEDICINE CLINIC | Age: 26
End: 2021-06-29
Payer: COMMERCIAL

## 2021-06-29 VITALS
TEMPERATURE: 98.8 F | DIASTOLIC BLOOD PRESSURE: 80 MMHG | OXYGEN SATURATION: 100 % | SYSTOLIC BLOOD PRESSURE: 126 MMHG | WEIGHT: 152 LBS | HEIGHT: 58 IN | RESPIRATION RATE: 16 BRPM | HEART RATE: 95 BPM | BODY MASS INDEX: 31.91 KG/M2

## 2021-06-29 DIAGNOSIS — Z00.00 WELL WOMAN EXAM (NO GYNECOLOGICAL EXAM): Primary | ICD-10-CM

## 2021-06-29 DIAGNOSIS — F41.9 ANXIOUSNESS: ICD-10-CM

## 2021-06-29 DIAGNOSIS — R07.89 CHEST WALL PAIN: ICD-10-CM

## 2021-06-29 PROCEDURE — 99395 PREV VISIT EST AGE 18-39: CPT | Performed by: FAMILY MEDICINE

## 2021-06-29 NOTE — PROGRESS NOTES
Subjective:   30 y.o. female for Well Woman Check. Never had Pap smear done. Also does not want to get it today. Never been sexually active. No pelvic pain no vaginal discharge. No family history of breast cancer, cervical cancer or ovarian cancer. No family history of colon cancer. Does self breast exam.  No concern. No concern with the menstrual cycle. She does have anxiousness related to work or physical exertion. Chest wall pain. Her cardiac work-up negative. It is getting better over the time per patient. Also has not seen psychiatrist and said their office did not call back. Feeling better so does not want to see them at this time. Does not take any medication at this time. Said if she does physical exertion at that time sometimes she feels chest wall pain and it resolves on resting. No shortness of breath. No cold cough or wheezing. It has been getting better. I have discussed to try Advil before she goes to work and see if that helps. Also she is cutting her work hours that might help observe for now. Patient Active Problem List    Diagnosis Date Noted    Vitamin D deficiency 08/03/2018    Cough due to bronchospasm 03/12/2014    AR (allergic rhinitis) 03/12/2014     Current Outpatient Medications   Medication Sig Dispense Refill    ibuprofen (AdviL) 200 mg tablet Take 200 mg by mouth daily as needed for Pain.  cyanocobalamin, vitamin B-12, (VITAMIN B-12 PO) Take  by mouth.  albuterol (PROVENTIL HFA, VENTOLIN HFA, PROAIR HFA) 90 mcg/actuation inhaler Take 2 Puffs by inhalation every six (6) hours as needed for Wheezing. 1 Inhaler 0    acetaminophen (TYLENOL EXTRA STRENGTH) 500 mg tablet Take 1,000 mg by mouth every six (6) hours as needed for Pain.  cholecalciferol, vitamin D3, (VITAMIN D3) 2,000 unit tab Take 2,000 Units by mouth daily.  multivitamin (ONE A DAY) tablet Take 1 Tab by mouth daily.        No Known Allergies  No past medical history on file.  Past Surgical History:   Procedure Laterality Date    HX WISDOM TEETH EXTRACTION Bilateral 2012    IA INCISIONAL BIOPSY SKIN SINGLE LESION N/A 04/11/2019    Dr. Ned Beverly     Family History   Problem Relation Age of Onset    Hypertension Mother    Juanito Velázquez Elevated Lipids Mother     Arthritis-osteo Mother     Arthritis-rheumatoid Maternal Grandmother     Stroke Maternal Grandmother     Hypertension Maternal Grandmother     Hypertension Paternal Grandmother     Hypertension Father     Elevated Lipids Father     Arthritis-osteo Father      Social History     Tobacco Use    Smoking status: Never Smoker    Smokeless tobacco: Never Used   Substance Use Topics    Alcohol use: No        Lab Results   Component Value Date/Time    WBC 8.7 12/14/2020 12:41 PM    HGB 13.4 12/14/2020 12:41 PM    HCT 39.3 12/14/2020 12:41 PM    PLATELET 230 62/93/5012 12:41 PM    MCV 94.0 12/14/2020 12:41 PM     Lab Results   Component Value Date/Time    Sodium 140 12/14/2020 12:41 PM    Potassium 4.1 12/14/2020 12:41 PM    Chloride 106 12/14/2020 12:41 PM    CO2 29 12/14/2020 12:41 PM    Anion gap 5 12/14/2020 12:41 PM    Glucose 89 12/14/2020 12:41 PM    BUN 14 12/14/2020 12:41 PM    Creatinine 0.64 12/14/2020 12:41 PM    BUN/Creatinine ratio 22 (H) 12/14/2020 12:41 PM    GFR est AA >60 12/14/2020 12:41 PM    GFR est non-AA >60 12/14/2020 12:41 PM    Calcium 9.1 12/14/2020 12:41 PM    Bilirubin, total 0.3 12/14/2020 12:41 PM    Alk.  phosphatase 77 12/14/2020 12:41 PM    Protein, total 7.1 12/14/2020 12:41 PM    Albumin 3.8 12/14/2020 12:41 PM    Globulin 3.3 12/14/2020 12:41 PM    A-G Ratio 1.2 12/14/2020 12:41 PM    ALT (SGPT) 32 12/14/2020 12:41 PM    AST (SGOT) 13 12/14/2020 12:41 PM     Lab Results   Component Value Date/Time    Cholesterol, total 141 08/03/2018 08:00 AM    HDL Cholesterol 49 08/03/2018 08:00 AM    LDL, calculated 79.2 08/03/2018 08:00 AM    VLDL, calculated 12.8 08/03/2018 08:00 AM    Triglyceride 64 08/03/2018 08:00 AM    CHOL/HDL Ratio 2.9 08/03/2018 08:00 AM     Lab Results   Component Value Date/Time    TSH 2.73 12/14/2020 12:41 PM     Lab Results   Component Value Date/Time    Hemoglobin A1c 5.4 08/03/2018 08:00 AM     Lab Results   Component Value Date/Time    Vitamin D 25-Hydroxy 52.2 11/20/2018 09:04 AM             ROS: Feeling generally well. No TIA's or unusual headaches, no dysphagia. No prolonged cough. No dyspnea or chest pain on exertion. No abdominal pain, change in bowel habits, black or bloody stools. No urinary tract symptoms. No new or unusual musculoskeletal symptoms. Specific concerns today: none     Objective: The patient appears well, alert, oriented x 3, in no distress. Visit Vitals  /80 (BP 1 Location: Left arm, BP Patient Position: Sitting, BP Cuff Size: Adult)   Pulse 95   Temp 98.8 °F (37.1 °C) (Oral)   Resp 16   Ht 4' 10\" (1.473 m)   Wt 152 lb (68.9 kg)   LMP 06/14/2021 (Exact Date)   SpO2 100%   BMI 31.77 kg/m²     ENT normal.  Neck supple. No adenopathy or thyromegaly. ARLEY. Lungs are clear, good air entry, no wheezes, rhonchi or rales. S1 and S2 normal, no murmurs, regular rate and rhythm. Abdomen soft without tenderness, guarding, mass or organomegaly. Extremities show no edema, normal peripheral pulses. Neurological is normal, no focal findings. Pelvic exam deferred by patient. Breast exam: bilaterally symmetrical, no palpable lump or abnormality. No axillary lymph nodes palpable bilaterally. No nipple pain or discharge bilaterally. Assessment/Plan:       ICD-10-CM ICD-9-CM    1. Well woman exam (no gynecological exam)  Z00.00 V70.0     [V70.0]   2. Anxiousness: More related to work. Not on any medication. Psychiatric office today for her back to she has not seen them yet. Does not want to see them as it has been improving gradually.   With the physical exertional activity she does feel sometimes chest wall pain when that resolves on its own with the rest.  Not taking any medication. I have advised to try Advil before she goes to work and see if that decreases the occurrence of the symptoms. F41.9 300.00    3. Chest wall pain: See above R07.89 786.52    Patient understood agreed with the plan  Patient has refused the Pap  Educated her for the Pap. Advised if she gets ready before the next visit continue she should make an appointment to get the Pap done  Follow-up and Dispositions    · Return in about 6 months (around 12/29/2021). Please note that this dictation was completed with [employer], the [employer] voice recognition software. Quite often unanticipated grammatical, syntax, homophones, and other interpretive errors are inadvertently transcribed by the computer software. Please disregard these errors. Please excuse any errors that have escaped final proofreading.

## 2021-06-29 NOTE — PATIENT INSTRUCTIONS
Well Visit, Ages 25 to 48: Care Instructions  Overview     Well visits can help you stay healthy. Your doctor has checked your overall health and may have suggested ways to take good care of yourself. Your doctor also may have recommended tests. At home, you can help prevent illness with healthy eating, regular exercise, and other steps. Follow-up care is a key part of your treatment and safety. Be sure to make and go to all appointments, and call your doctor if you are having problems. It's also a good idea to know your test results and keep a list of the medicines you take. How can you care for yourself at home? · Get screening tests that you and your doctor decide on. Screening helps find diseases before any symptoms appear. · Eat healthy foods. Choose fruits, vegetables, whole grains, protein, and low-fat dairy foods. Limit fat, especially saturated fat. Reduce salt in your diet. · Limit alcohol. If you are a man, have no more than 2 drinks a day or 14 drinks a week. If you are a woman, have no more than 1 drink a day or 7 drinks a week. · Get at least 30 minutes of physical activity on most days of the week. Walking is a good choice. You also may want to do other activities, such as running, swimming, cycling, or playing tennis or team sports. Discuss any changes in your exercise program with your doctor. · Reach and stay at a healthy weight. This will lower your risk for many problems, such as obesity, diabetes, heart disease, and high blood pressure. · Do not smoke or allow others to smoke around you. If you need help quitting, talk to your doctor about stop-smoking programs and medicines. These can increase your chances of quitting for good. · Care for your mental health. It is easy to get weighed down by worry and stress. Learn strategies to manage stress, like deep breathing and mindfulness, and stay connected with your family and community.  If you find you often feel sad or hopeless, talk with your doctor. Treatment can help. · Talk to your doctor about whether you have any risk factors for sexually transmitted infections (STIs). You can help prevent STIs if you wait to have sex with a new partner (or partners) until you've each been tested for STIs. It also helps if you use condoms (male or female condoms) and if you limit your sex partners to one person who only has sex with you. Vaccines are available for some STIs, such as HPV. · Use birth control if it's important to you to prevent pregnancy. Talk with your doctor about the choices available and what might be best for you. · If you think you may have a problem with alcohol or drug use, talk to your doctor. This includes prescription medicines (such as amphetamines and opioids) and illegal drugs (such as cocaine and methamphetamine). Your doctor can help you figure out what type of treatment is best for you. · Protect your skin from too much sun. When you're outdoors from 10 a.m. to 4 p.m., stay in the shade or cover up with clothing and a hat with a wide brim. Wear sunglasses that block UV rays. Even when it's cloudy, put broad-spectrum sunscreen (SPF 30 or higher) on any exposed skin. · See a dentist one or two times a year for checkups and to have your teeth cleaned. · Wear a seat belt in the car. When should you call for help? Watch closely for changes in your health, and be sure to contact your doctor if you have any problems or symptoms that concern you. Where can you learn more? Go to http://www.Letsgofordinner.com/  Enter P072 in the search box to learn more about \"Well Visit, Ages 25 to 48: Care Instructions. \"  Current as of: May 27, 2020               Content Version: 12.8  © 2264-1753 [employer] Inc[employer]. Care instructions adapted under license by [employer] (which disclaims liability or warranty for this information).  If you have questions about a medical condition or this instruction, always ask your healthcare professional. [place] [address] any warranty or liability for your use of this information. Breast Self-Exam: Care Instructions  Your Care Instructions     A breast self-exam is when you check your breasts for lumps or changes. This regular exam helps you learn how your breasts normally look and feel. Most breast problems or changes are not because of cancer. Breast self-exam is not a substitute for a mammogram. Having regular breast exams by your doctor and regular mammograms improve your chances of finding any problems with your breasts. Some women set a time each month to do a step-by-step breast self-exam. Other women like a less formal system. They might look at their breasts as they brush their teeth, or feel their breasts once in a while in the shower. If you notice a change in your breast, tell your doctor. Follow-up care is a key part of your treatment and safety. Be sure to make and go to all appointments, and call your doctor if you are having problems. It's also a good idea to know your test results and keep a list of the medicines you take. How do you do a breast self-exam?  · The best time to examine your breasts is usually one week after your menstrual period begins. Your breasts should not be tender then. If you do not have periods, you might do your exam on a day of the month that is easy to remember. · To examine your breasts:  ? Remove all your clothes above the waist and lie down. When you are lying down, your breast tissue spreads evenly over your chest wall, which makes it easier to feel all your breast tissue. ? Use the padsnot the fingertipsof the 3 middle fingers of your left hand to check your right breast. Move your fingers slowly in small coin-sized circles that overlap. ? Use three levels of pressure to feel of all your breast tissue. Use light pressure to feel the tissue close to the skin surface.  Use medium pressure to feel a little deeper. Use firm pressure to feel your tissue close to your breastbone and ribs. Use each pressure level to feel your breast tissue before moving on to the next spot. ? Check your entire breast, moving up and down as if following a strip from the collarbone to the bra line, and from the armpit to the ribs. Repeat until you have covered the entire breast.  ? Repeat this procedure for your left breast, using the pads of the 3 middle fingers of your right hand. · To examine your breasts while in the shower:  ? Place one arm over your head and lightly soap your breast on that side. ? Using the pads of your fingers, gently move your hand over your breast (in the strip pattern described above), feeling carefully for any lumps or changes. ? Repeat for the other breast.  · Have your doctor inspect anything you notice to see if you need further testing. Where can you learn more? Go to http://www.gray.com/  Enter P148 in the search box to learn more about \"Breast Self-Exam: Care Instructions. \"  Current as of: December 17, 2020               Content Version: 12.8  © 6883-2662 [employer]. Care instructions adapted under license by [employer] (which disclaims liability or warranty for this information). If you have questions about a medical condition or this instruction, always ask your healthcare professional. [place] [address] any warranty or liability for your use of this information. Pap Test: Care Instructions  Overview     The Pap test (also called a Pap smear) is a screening test for cancer of the cervix, which is the lower part of the uterus that opens into the vagina. The test can help your doctor find early changes in the cells that could lead to cancer. The sample of cells taken during your test has been sent to a lab so that an expert can look at the cells. It usually takes a week or two to get the results back.   Follow-up care is a key part of your treatment and safety. Be sure to make and go to all appointments, and call your doctor if you are having problems. It's also a good idea to know your test results and keep a list of the medicines you take. What do the results mean? · A normal result means that the test did not find any abnormal cells in the sample. · An abnormal result can mean many things. Most of these are not cancer. The results of your test may be abnormal because:  ? You have an infection of the vagina or cervix, such as a yeast infection. ? You have an IUD (intrauterine device for birth control). ? You have low estrogen levels after menopause that are causing the cells to change. ? You have cell changes that may be a sign of precancer or cancer. The results are ranked based on how serious the changes might be. There are many other reasons why you might not get a normal result. If the results were abnormal, you may need to get another test within a few weeks or months. If the results show changes that could be a sign of cancer, you may need a test called a colposcopy, which provides a more complete view of the cervix. Sometimes the lab cannot use the sample because it does not contain enough cells or was not preserved well. If so, you may need to have the test again. This is not common, but it does happen from time to time. When should you call for help? Watch closely for changes in your health, and be sure to contact your doctor if:    · You have vaginal bleeding or pain for more than 2 days after the test. It is normal to have a small amount of bleeding for a day or two after the test.   Where can you learn more? Go to http://www.gray.com/  Enter P039 in the search box to learn more about \"Pap Test: Care Instructions. \"  Current as of: December 17, 2020               Content Version: 12.8  © 1296-7935 Healthwise, Incorporated.    Care instructions adapted under license by [employer] (which disclaims liability or warranty for this information). If you have questions about a medical condition or this instruction, always ask your healthcare professional. [address] any warranty or liability for your use of this information.

## 2021-06-29 NOTE — PROGRESS NOTES
Chief Complaint   Patient presents with    Well Woman     still electing to not get a pap    Depression     improving     1. Have you been to the ER, urgent care clinic since your last visit? Hospitalized since your last visit? No    2. Have you seen or consulted any other health care providers outside of the [address] [place] [address] since your last visit? Include any pap smears or colon screening.  No

## 2021-07-23 ENCOUNTER — TELEPHONE (OUTPATIENT)
Dept: FAMILY MEDICINE CLINIC | Age: 26
End: 2021-07-23

## 2021-07-23 NOTE — TELEPHONE ENCOUNTER
Patient dropped off [address].  Forms/results placed in nurse's in-basket for review

## 2021-07-27 NOTE — TELEPHONE ENCOUNTER
Contacted patient and verified identity using name and date of birth (2- identifiers)  Spoke with patient and she is aware that Dr. Lulú Cross is out of the office until Monday 8/2/21

## 2021-12-29 ENCOUNTER — OFFICE VISIT (OUTPATIENT)
Dept: FAMILY MEDICINE CLINIC | Age: 26
End: 2021-12-29
Payer: COMMERCIAL

## 2021-12-29 VITALS
HEART RATE: 93 BPM | TEMPERATURE: 97.8 F | DIASTOLIC BLOOD PRESSURE: 68 MMHG | SYSTOLIC BLOOD PRESSURE: 126 MMHG | WEIGHT: 156.4 LBS | HEIGHT: 58 IN | RESPIRATION RATE: 16 BRPM | OXYGEN SATURATION: 98 % | BODY MASS INDEX: 32.83 KG/M2

## 2021-12-29 DIAGNOSIS — F41.9 ANXIOUSNESS: ICD-10-CM

## 2021-12-29 DIAGNOSIS — R07.89 CHEST WALL PAIN: Primary | ICD-10-CM

## 2021-12-29 PROCEDURE — 99213 OFFICE O/P EST LOW 20 MIN: CPT | Performed by: FAMILY MEDICINE

## 2021-12-29 NOTE — PATIENT INSTRUCTIONS
A Healthy Lifestyle: Care Instructions  Your Care Instructions     A healthy lifestyle can help you feel good, stay at a healthy weight, and have plenty of energy for both work and play. A healthy lifestyle is something you can share with your whole family. A healthy lifestyle also can lower your risk for serious health problems, such as high blood pressure, heart disease, and diabetes. You can follow a few steps listed below to improve your health and the health of your family. Follow-up care is a key part of your treatment and safety. Be sure to make and go to all appointments, and call your doctor if you are having problems. It's also a good idea to know your test results and keep a list of the medicines you take. How can you care for yourself at home? · Do not eat too much sugar, fat, or fast foods. You can still have dessert and treats now and then. The goal is moderation. · Start small to improve your eating habits. Pay attention to portion sizes, drink less juice and soda pop, and eat more fruits and vegetables. ? Eat a healthy amount of food. A 3-ounce serving of meat, for example, is about the size of a deck of cards. Fill the rest of your plate with vegetables and whole grains. ? Limit the amount of soda and sports drinks you have every day. Drink more water when you are thirsty. ? Eat plenty of fruits and vegetables every day. Have an apple or some carrot sticks as an afternoon snack instead of a candy bar. Try to have fruits and/or vegetables at every meal.  · Make exercise part of your daily routine. You may want to start with simple activities, such as walking, bicycling, or slow swimming. Try to be active 30 to 60 minutes every day. You do not need to do all 30 to 60 minutes all at once. For example, you can exercise 3 times a day for 10 or 20 minutes.  Moderate exercise is safe for most people, but it is always a good idea to talk to your doctor before starting an exercise program.  · Keep moving. Doll Evelin the lawn, work in the garden, or S[place]. Take the stairs instead of the elevator at work. · If you smoke, quit. People who smoke have an increased risk for heart attack, stroke, cancer, and other lung illnesses. Quitting is hard, but there are ways to boost your chance of quitting tobacco for good. ? Use nicotine gum, patches, or lozenges. ? Ask your doctor about stop-smoking programs and medicines. ? Keep trying. In addition to reducing your risk of diseases in the future, you will notice some benefits soon after you stop using tobacco. If you have shortness of breath or asthma symptoms, they will likely get better within a few weeks after you quit. · Limit how much alcohol you drink. Moderate amounts of alcohol (up to 2 drinks a day for men, 1 drink a day for women) are okay. But drinking too much can lead to liver problems, high blood pressure, and other health problems. Family health  If you have a family, there are many things you can do together to improve your health. · Eat meals together as a family as often as possible. · Eat healthy foods. This includes fruits, vegetables, lean meats and dairy, and whole grains. · Include your family in your fitness plan. Most people think of activities such as jogging or tennis as the way to fitness, but there are many ways you and your family can be more active. Anything that makes you breathe hard and gets your heart pumping is exercise. Here are some tips:  ? Walk to do errands or to take your child to school or the bus.  ? Go for a family bike ride after dinner instead of watching TV. Where can you learn more? Go to http://www.gray.com/  Enter W181 in the search box to learn more about \"A Healthy Lifestyle: Care Instructions. \"  Current as of: June 16, 2021               Content Version: 13.0  © 8271-2717 Healthwise, Incorporated.    Care instructions adapted under license by [employer] (which disclaims liability or warranty for this information). If you have questions about a medical condition or this instruction, always ask your healthcare professional. [address] any warranty or liability for your use of this information.

## 2021-12-29 NOTE — PROGRESS NOTES
HISTORY OF PRESENT ILLNESS  Taj Hayden is a 27 y.o. female. HPI: Here for follow-up. Was feeling chest wall tightness and pain at work and was feeling stressful work environment. Feeling anxious. Has a long the technique of relaxation and being active with physical exercise and diet modification symptoms has been much better. Does not see any counselor. To help her mom who is a major support for her is been helping. No panic attacks. Sitting comfortable without any neck distress. Denies any depression. Regarding his work-related symptoms which include the chest wall pain and anxiousness cardiac work-up negative. According to cardiology no cardiac cause of her symptoms. No headache or dizziness at this time. No recent chest pain episode. No palpitation or diaphoresis. No nausea vomiting or abdominal pain. No urinary or bowel complaint. No weight or appetite change. No trouble swallowing or change in voice. Visit Vitals  /68 (BP 1 Location: Left upper arm, BP Patient Position: Sitting, BP Cuff Size: Adult)   Pulse 93   Temp 97.8 °F (36.6 °C) (Temporal)   Resp 16   Ht 4' 10\" (1.473 m)   Wt 156 lb 6.4 oz (70.9 kg)   SpO2 98%   BMI 32.69 kg/m²     Review medication list, vitals, problem list,allergies.    Lab Results   Component Value Date/Time    WBC 8.7 12/14/2020 12:41 PM    HGB 13.4 12/14/2020 12:41 PM    HCT 39.3 12/14/2020 12:41 PM    PLATELET 087 31/76/1841 12:41 PM    MCV 94.0 12/14/2020 12:41 PM     Lab Results   Component Value Date/Time    Sodium 140 12/14/2020 12:41 PM    Potassium 4.1 12/14/2020 12:41 PM    Chloride 106 12/14/2020 12:41 PM    CO2 29 12/14/2020 12:41 PM    Anion gap 5 12/14/2020 12:41 PM    Glucose 89 12/14/2020 12:41 PM    BUN 14 12/14/2020 12:41 PM    Creatinine 0.64 12/14/2020 12:41 PM    BUN/Creatinine ratio 22 (H) 12/14/2020 12:41 PM    GFR est AA >60 12/14/2020 12:41 PM    GFR est non-AA >60 12/14/2020 12:41 PM    Calcium 9.1 12/14/2020 12:41 PM    Bilirubin, total 0.3 12/14/2020 12:41 PM    Alk. phosphatase 77 12/14/2020 12:41 PM    Protein, total 7.1 12/14/2020 12:41 PM    Albumin 3.8 12/14/2020 12:41 PM    Globulin 3.3 12/14/2020 12:41 PM    A-G Ratio 1.2 12/14/2020 12:41 PM    ALT (SGPT) 32 12/14/2020 12:41 PM    AST (SGOT) 13 12/14/2020 12:41 PM     Lab Results   Component Value Date/Time    Cholesterol, total 141 08/03/2018 08:00 AM    HDL Cholesterol 49 08/03/2018 08:00 AM    LDL, calculated 79.2 08/03/2018 08:00 AM    VLDL, calculated 12.8 08/03/2018 08:00 AM    Triglyceride 64 08/03/2018 08:00 AM    CHOL/HDL Ratio 2.9 08/03/2018 08:00 AM     Lab Results   Component Value Date/Time    TSH 2.73 12/14/2020 12:41 PM     Lab Results   Component Value Date/Time    Hemoglobin A1c 5.4 08/03/2018 08:00 AM     Lab Results   Component Value Date/Time    Vitamin D 25-Hydroxy 52.2 11/20/2018 09:04 AM           ROS: See HPI    Physical Exam  Constitutional:       General: She is not in acute distress. Cardiovascular:      Rate and Rhythm: Normal rate and regular rhythm. Heart sounds: Normal heart sounds. Abdominal:      General: Bowel sounds are normal.      Palpations: Abdomen is soft. Tenderness: There is no abdominal tenderness. Musculoskeletal:         General: No swelling. Cervical back: Neck supple. Neurological:      Mental Status: She is oriented to person, place, and time. Psychiatric:         Behavior: Behavior normal.         ASSESSMENT and PLAN    ICD-10-CM ICD-9-CM    1. Chest wall pain: More work-related exertion. Cardiac work-up negative. Currently symptoms is improved with the lifestyle modification and relaxation techniques. Will observe. R07.89 786.52    2. Anxiousness: Not following any counselor. Feeling much better. Will observe F41.9 300.00    Patient understood agreed with the plan  Also reminded she is due for Pap smear. She has refused during last physical as she been not sexually active and does not want to do it at this time.  She is aware on recommendation  Follow-up and Dispositions    · Return in about 6 months (around 6/29/2022). Please note that this dictation was completed with [employer], the computer voice recognition software. Quite often unanticipated grammatical, syntax, homophones, and other interpretive errors are inadvertently transcribed by the computer software. Please disregard these errors. Please excuse any errors that have escaped final proofreading.

## 2021-12-29 NOTE — PROGRESS NOTES
1. Have you been to the ER, urgent care clinic since your last visit? Hospitalized since your last visit? No    2. Have you seen or consulted any other health care providers outside of the [address] [place] [address] since your last visit? Include any pap smears or colon screening.  No    Chief Complaint   Patient presents with    Other     anxiousness and chest wall pain follow-up    Other     wants to discuss what kind of shoe to wear for comfort/ walks and standing alot for work

## 2022-03-20 PROBLEM — E55.9 VITAMIN D DEFICIENCY: Status: ACTIVE | Noted: 2018-08-03

## 2022-06-23 ENCOUNTER — OFFICE VISIT (OUTPATIENT)
Dept: FAMILY MEDICINE CLINIC | Age: 27
End: 2022-06-23
Payer: COMMERCIAL

## 2022-06-23 VITALS
BODY MASS INDEX: 33.63 KG/M2 | WEIGHT: 160.2 LBS | RESPIRATION RATE: 16 BRPM | SYSTOLIC BLOOD PRESSURE: 122 MMHG | HEART RATE: 92 BPM | OXYGEN SATURATION: 99 % | TEMPERATURE: 98.1 F | HEIGHT: 58 IN | DIASTOLIC BLOOD PRESSURE: 78 MMHG

## 2022-06-23 DIAGNOSIS — N94.6 DYSMENORRHEA: Primary | ICD-10-CM

## 2022-06-23 DIAGNOSIS — F43.29 ADJUSTMENT DISORDER WITH WORK PROBLEMS: ICD-10-CM

## 2022-06-23 DIAGNOSIS — F41.9 ANXIOUSNESS: ICD-10-CM

## 2022-06-23 DIAGNOSIS — R07.89 CHEST WALL PAIN: ICD-10-CM

## 2022-06-23 PROCEDURE — 99213 OFFICE O/P EST LOW 20 MIN: CPT | Performed by: FAMILY MEDICINE

## 2022-06-23 NOTE — PROGRESS NOTES
HISTORY OF PRESENT ILLNESS  Manuela James is a 27 y.o. female. HPI: Here for few concerns. Feeling more anxious. Recently changed the job since February as she was feeling prior job was stressful. Now doing different work but feeling still anxious with learning a new work and able to cope up at work and vomiting. He is inquiring. Emotional.  Feeling sad. Feeling anxious. No panic attack. During visit tearful. Did not appear in any acute distress. Painful menstrual cycle. First day. Able to handle pain with the over-the-counter Advil. Advised to continue with the plan. Chest wall pain has resolved at this time. Probably more due to anxiety. No cold cough or wheezing. Sitting without any acute distress. No headache or dizziness. No chest pain or palpitation or any diaphoresis. No nausea vomiting or abdominal pain. No urinary or bowel complaint. No known thyroid problem. No thoughts of hurting herself or someone else. Agreed to go for counseling. Visit Vitals  /78 (BP 1 Location: Left upper arm, BP Patient Position: Sitting, BP Cuff Size: Adult)   Pulse 92   Temp 98.1 °F (36.7 °C) (Temporal)   Resp 16   Ht 4' 10\" (1.473 m)   Wt 160 lb 3.2 oz (72.7 kg)   SpO2 99%   BMI 33.48 kg/m²     Review medication list, vitals, problem list,allergies.    Lab Results   Component Value Date/Time    TSH 2.73 12/14/2020 12:41 PM     Lab Results   Component Value Date/Time    WBC 8.7 12/14/2020 12:41 PM    HGB 13.4 12/14/2020 12:41 PM    HCT 39.3 12/14/2020 12:41 PM    PLATELET 818 86/42/8580 12:41 PM    MCV 94.0 12/14/2020 12:41 PM     Lab Results   Component Value Date/Time    Sodium 140 12/14/2020 12:41 PM    Potassium 4.1 12/14/2020 12:41 PM    Chloride 106 12/14/2020 12:41 PM    CO2 29 12/14/2020 12:41 PM    Anion gap 5 12/14/2020 12:41 PM    Glucose 89 12/14/2020 12:41 PM    BUN 14 12/14/2020 12:41 PM    Creatinine 0.64 12/14/2020 12:41 PM    BUN/Creatinine ratio 22 (H) 12/14/2020 12:41 PM GFR est AA >60 12/14/2020 12:41 PM    GFR est non-AA >60 12/14/2020 12:41 PM    Calcium 9.1 12/14/2020 12:41 PM    Bilirubin, total 0.3 12/14/2020 12:41 PM    Alk. phosphatase 77 12/14/2020 12:41 PM    Protein, total 7.1 12/14/2020 12:41 PM    Albumin 3.8 12/14/2020 12:41 PM    Globulin 3.3 12/14/2020 12:41 PM    A-G Ratio 1.2 12/14/2020 12:41 PM    ALT (SGPT) 32 12/14/2020 12:41 PM    AST (SGOT) 13 12/14/2020 12:41 PM       ROS: See HPI    Physical Exam  Constitutional:       General: She is not in acute distress. Cardiovascular:      Rate and Rhythm: Normal rate and regular rhythm. Heart sounds: Normal heart sounds. Neurological:      Mental Status: She is oriented to person, place, and time. Psychiatric:      Comments: Emotional and tearful while talking. Did not appear in any acute distress         ASSESSMENT and PLAN    ICD-10-CM ICD-9-CM    1. Dysmenorrhea: Stable with OTC Advil. Continue and observe N94.6 625.3    2. Chest wall pain: Has resolved at this R07.89 786.52    3. Adjustment disorder with work problems time: Probably feeling anxious at this time due to adjustment at the new work. Agreed to go for counseling. Discussed little bit planning of the day I had of time etc.  She will work on chain adjustment. Also if she wants to start medication she reported back F43.29 309.23 REFERRAL TO PSYCHOLOGY   4. Anxiousness  F41.9 300.00 REFERRAL TO PSYCHOLOGY   5. BMI 33.0-33.9,adult: Advised routine exercise, diet modification. Importance of weight loss been discussed. T84.72 V85.33    Patient understood and agreed with the plan  Follow-up and Dispositions    · Return in about 2 months (around 8/23/2022) for need an appt for physical with pap . Please note that this dictation was completed with [email or web address], the [employer] voice recognition software. Quite often unanticipated grammatical, syntax, homophones, and other interpretive errors are inadvertently transcribed by the computer software. Please disregard these errors. Please excuse any errors that have escaped final proofreading. guarding/soft

## 2022-06-23 NOTE — PATIENT INSTRUCTIONS
Anxiety Disorder: Care Instructions  Your Care Instructions     Anxiety is a normal reaction to stress. Difficult situations can cause you to have symptoms such as sweaty palms and a nervous feeling. In an anxiety disorder, the symptoms are far more severe. Constant worry, muscle tension, trouble sleeping, nausea and diarrhea, and other symptoms can make normal daily activities difficult or impossible. These symptoms may occur for no reason, and they can affect your work, school, or social life. Medicines, counseling, and self-care can all help. Follow-up care is a key part of your treatment and safety. Be sure to make and go to all appointments, and call your doctor if you are having problems. It's also a good idea to know your test results and keep a list of the medicines you take. How can you care for yourself at home? · Take medicines exactly as directed. Call your doctor if you think you are having a problem with your medicine. · Go to your counseling sessions and follow-up appointments. · Recognize and accept your anxiety. Then, when you are in a situation that makes you anxious, say to yourself, \"This is not an emergency. I feel uncomfortable, but I am not in danger. I can keep going even if I feel anxious. \"  · Be kind to your body:  ? Relieve tension with exercise or a massage. ? Get enough rest.  ? Avoid alcohol, caffeine, nicotine, and illegal drugs. They can increase your anxiety level and cause sleep problems. ? Learn and do relaxation techniques. See below for more about these techniques. · Engage your mind. Get out and do something you enjoy. Go to a funny movie, or take a walk or hike. Plan your day. Having too much or too little to do can make you anxious. · Keep a record of your symptoms. Discuss your fears with a good friend or family member, or join a support group for people with similar problems. Talking to others sometimes relieves stress.   · Get involved in social groups, or volunteer to help others. Being alone sometimes makes things seem worse than they are. · Get at least 30 minutes of exercise on most days of the week to relieve stress. Walking is a good choice. You also may want to do other activities, such as running, swimming, cycling, or playing tennis or team sports. Relaxation techniques  Do relaxation exercises 10 to 20 minutes a day. You can play soothing, relaxing music while you do them, if you wish. · Tell others in your house that you are going to do your relaxation exercises. Ask them not to disturb you. · Find a comfortable place, away from all distractions and noise. · Lie down on your back, or sit with your back straight. · Focus on your breathing. Make it slow and steady. · Breathe in through your nose. Breathe out through either your nose or mouth. · Breathe deeply, filling up the area between your navel and your rib cage. Breathe so that your belly goes up and down. · Do not hold your breath. · Breathe like this for 5 to 10 minutes. Notice the feeling of calmness throughout your whole body. As you continue to breathe slowly and deeply, relax by doing the following for another 5 to 10 minutes:  · Tighten and relax each muscle group in your body. You can begin at your toes and work your way up to your head. · Imagine your muscle groups relaxing and becoming heavy. · Empty your mind of all thoughts. · Let yourself relax more and more deeply. · Become aware of the state of calmness that surrounds you. · When your relaxation time is over, you can bring yourself back to alertness by moving your fingers and toes and then your hands and feet and then stretching and moving your entire body. Sometimes people fall asleep during relaxation, but they usually wake up shortly afterward. · Always give yourself time to return to full alertness before you drive a car or do anything that might cause an accident if you are not fully alert.  Never play a relaxation tape while you drive a car. When should you call for help? Call 911 anytime you think you may need emergency care. For example, call if:    · You feel you cannot stop from hurting yourself or someone else. Keep the numbers for these national suicide hotlines: 5-822-675-TALK (9-364.254.4185) and 7-318-YGSCCVK (1-849.387.6100). If you or someone you know talks about suicide or feeling hopeless, get help right away. Watch closely for changes in your health, and be sure to contact your doctor if:    · You have anxiety or fear that affects your life.     · You have symptoms of anxiety that are new or different from those you had before. Where can you learn more? Go to http://www.jacob.com/  Enter P754 in the search box to learn more about \"Anxiety Disorder: Care Instructions. \"  Current as of: June 16, 2021               Content Version: 13.2  © 2006-2022 [employer]. Care instructions adapted under license by [employer] (which disclaims liability or warranty for this information). If you have questions about a medical condition or this instruction, always ask your healthcare professional. [address] any warranty or liability for your use of this information. Painful Menstrual Cramps: Care Instructions  Overview     Painful menstrual cramps (dysmenorrhea) can occur during or just before your period. The cramping can involve your lower belly, back, or thighs. And the pain from these cramps can range from mild to severe. You may also have diarrhea, constipation, or nausea. Or you may get dizzy. Pain medicine and home treatment can help you feel better. Follow-up care is a key part of your treatment and safety. Be sure to make and go to all appointments, and call your doctor if you are having problems. It's also a good idea to know your test results and keep a list of the medicines you take.   How can you care for yourself at home?  · Take anti-inflammatory medicines for pain. Ibuprofen (Advil, Motrin) and naproxen (Aleve) usually work better than aspirin. ? Be safe with medicines. Talk to your doctor or pharmacist before you take any of these medicines. They may not be safe if you take other medicines or have other health problems. ? Start taking the recommended dose of pain medicine as soon as you start to feel pain. Or you can start on the day before your period. Keep taking the medicine for as many days as you have cramps. ? If anti-inflammatory medicines don't help, try acetaminophen (Tylenol). ? Do not take two or more pain medicines at the same time unless the doctor told you to. Many pain medicines have acetaminophen, which is Tylenol. Too much acetaminophen (Tylenol) can be harmful. ? Read and follow all instructions on the label. · Put a heating pad set on low or a hot water bottle on your belly. Or take a warm bath. Heat improves blood flow and may help with pain. · Lie down and put a pillow under your knees. Or lie on your side and bring your knees up to your chest. This will help with any back pressure. · Get at least 30 minutes of exercise on most days of the week. This improves blood flow and may decrease pain. Walking is a good choice. You also may want to do other activities, such as running, swimming, cycling, or playing sports. When should you call for help? Call your doctor now or seek immediate medical care if:    · You have severe vaginal bleeding.     · You have new or worse belly or pelvic pain. Watch closely for changes in your health, and be sure to contact your doctor if:    · You have unusual vaginal bleeding.     · You do not get better as expected. Where can you learn more? Go to http://www.gray.com/  Enter Z243 in the search box to learn more about \"Painful Menstrual Cramps: Care Instructions. \"  Current as of: November 22, 2021               Content Version: 13.2  © 7077-9865 Healthwise, Incorporated. Care instructions adapted under license by [employer] (which disclaims liability or warranty for this information). If you have questions about a medical condition or this instruction, always ask your healthcare professional. [place] [address] any warranty or liability for your use of this information.

## 2022-06-23 NOTE — PROGRESS NOTES
1. \"Have you been to the ER, urgent care clinic since your last visit? Hospitalized since your last visit? \" No    2. \"Have you seen or consulted any other health care providers outside of the [address] [place] [address] since your last visit? \" No    3. For patients aged 39-70: Has the patient had a colonoscopy / FIT/ Cologuard? No    If the patient is female:    4. For patients aged 41-77: Has the patient had a mammogram within the past 2 years? No    5. For patients aged 21-65: Has the patient had a pap smear?  No    Chief Complaint   Patient presents with    Other     anxiousness and chest wall pain follow-up

## 2022-07-14 ENCOUNTER — TELEPHONE (OUTPATIENT)
Dept: FAMILY MEDICINE CLINIC | Age: 27
End: 2022-07-14

## 2022-07-14 DIAGNOSIS — U07.1 LAB TEST POSITIVE FOR DETECTION OF COVID-19 VIRUS: Primary | ICD-10-CM

## 2022-07-14 RX ORDER — NIRMATRELVIR AND RITONAVIR 300-100 MG
KIT ORAL
Qty: 1 BOX | Refills: 0 | Status: SHIPPED | OUTPATIENT
Start: 2022-07-14

## 2022-07-14 NOTE — TELEPHONE ENCOUNTER
Pt called to let Dr. Dain Law she tested positive for Covid today. Pt states she has a fever, cough, and congestion. Pt states she is managing her symptoms by taking Tylenol, otc cough medicine, and staying hydrated. Pt states she does not need anything right now just informing Dr. Dain Law.

## 2022-07-14 NOTE — TELEPHONE ENCOUNTER
Pharmacy Progress Note     Called patient to discuss Paxlovid treatment. Reviewed directions for use and potential side effects. Patient expressed understanding, all questions answered at this time. Thank you,  Duglas Barcenas. [address] in place:  Yes   Recommendation Provided To: Patient/Caregiver: 0 via Telephone   Intervention Accepted By: Patient/Caregiver: 0   Time Spent (min): 15

## 2022-09-19 ENCOUNTER — OFFICE VISIT (OUTPATIENT)
Dept: FAMILY MEDICINE CLINIC | Age: 27
End: 2022-09-19
Payer: COMMERCIAL

## 2022-09-19 ENCOUNTER — HOSPITAL ENCOUNTER (OUTPATIENT)
Dept: LAB | Age: 27
Discharge: HOME | End: 2022-09-19
Payer: COMMERCIAL

## 2022-09-19 VITALS
WEIGHT: 164.2 LBS | TEMPERATURE: 98.1 F | RESPIRATION RATE: 16 BRPM | HEART RATE: 86 BPM | BODY MASS INDEX: 34.47 KG/M2 | HEIGHT: 58 IN | DIASTOLIC BLOOD PRESSURE: 72 MMHG | SYSTOLIC BLOOD PRESSURE: 136 MMHG | OXYGEN SATURATION: 99 %

## 2022-09-19 DIAGNOSIS — Z12.4 SCREENING FOR MALIGNANT NEOPLASM OF CERVIX: ICD-10-CM

## 2022-09-19 DIAGNOSIS — Z01.419 WELL WOMAN EXAM WITH ROUTINE GYNECOLOGICAL EXAM: Primary | ICD-10-CM

## 2022-09-19 DIAGNOSIS — R03.0 ELEVATED BLOOD PRESSURE READING: ICD-10-CM

## 2022-09-19 PROCEDURE — 88175 CYTOPATH C/V AUTO FLUID REDO: CPT

## 2022-09-19 PROCEDURE — 99385 PREV VISIT NEW AGE 18-39: CPT | Performed by: FAMILY MEDICINE

## 2022-09-19 PROCEDURE — 87624 HPV HI-RISK TYP POOLED RSLT: CPT

## 2022-09-19 NOTE — PROGRESS NOTES
Subjective:   27 y.o. female for Well Woman Check. Patient's last menstrual period was 08/22/2022. Not sexually active. This is her first Pap. Does on and off self breast exam.  No family history of breast cancer, cervical cancer, ovarian cancer or colon cancer  Little anxious regarding procedure. Mildly elevated blood pressure but repeat was improved. Sitting without any acute distress  Denies any headache, dizziness, no chest pain or trouble breathing, no arm or leg weakness. No nausea or vomiting, no weight or appetite changes, no mood changes . No urine or bowel complains, no palpitation, no diaphoresis. No abdominal pain. No cold or cough. No leg swelling. No fever. No sleep trouble. Regular menstrual cycle no concern. Patient Active Problem List    Diagnosis Date Noted    Dysmenorrhea 06/23/2022    Adjustment disorder with work problems 06/23/2022    Anxiousness 06/23/2022    Vitamin D deficiency 08/03/2018    Cough due to bronchospasm 03/12/2014    AR (allergic rhinitis) 03/12/2014     Current Outpatient Medications   Medication Sig Dispense Refill    Lactobacillus acidophilus (PROBIOTIC PO) Take  by mouth.      cyanocobalamin, vitamin B-12, (VITAMIN B-12 PO) Take  by mouth. cholecalciferol, vitamin D3, 50 mcg (2,000 unit) tab Take 2,000 Units by mouth daily. multivitamin (ONE A DAY) tablet Take 1 Tab by mouth daily. nirmatrelvir-ritonavir (Paxlovid, EUA,) 150 mg x 2- 100 mg tablet Take as directed on the package (Patient not taking: Reported on 9/19/2022) 1 Box 0     No Known Allergies  No past medical history on file.   Past Surgical History:   Procedure Laterality Date    HX WISDOM TEETH EXTRACTION Bilateral 2012    MN INCISIONAL BIOPSY SKIN SINGLE LESION N/A 04/11/2019    Dr. Dave Clemente     Family History   Problem Relation Age of Onset    Hypertension Mother     Elevated Lipids Mother     OSTEOARTHRITIS Mother     Arthritis-rheumatoid Maternal Grandmother     Stroke Maternal Grandmother     Hypertension Maternal Grandmother     Hypertension Paternal Grandmother     Hypertension Father     Elevated Lipids Father     OSTEOARTHRITIS Father      Social History     Tobacco Use    Smoking status: Never    Smokeless tobacco: Never   Substance Use Topics    Alcohol use: No        ROS:  Feeling well. No dyspnea or chest pain on exertion. No abdominal pain, change in bowel habits, black or bloody stools. No urinary tract symptoms. GYN ROS: normal menses, no abnormal bleeding, pelvic pain or discharge, no breast pain or new or enlarging lumps on self exam. No neurological complaints. Objective:   Visit Vitals  BP (!) 140/76 (BP 1 Location: Left upper arm, BP Patient Position: Sitting, BP Cuff Size: Adult)   Pulse 86   Temp 98.1 °F (36.7 °C) (Temporal)   Resp 16   Ht 4' 10.25\" (1.48 m)   Wt 164 lb 3.2 oz (74.5 kg)   LMP 08/22/2022   SpO2 99%   BMI 34.02 kg/m²     The patient appears well, alert, oriented x 3, in no distress. ENT normal.  Neck supple. No adenopathy or thyromegaly. ARLEY. Lungs are clear, good air entry, no wheezes, rhonchi or rales. S1 and S2 normal, no murmurs, regular rate and rhythm. Abdomen soft without tenderness, guarding, mass or organomegaly. Extremities show no edema, normal peripheral pulses. Neurological is normal, no focal findings. BREAST EXAM: breasts appear normal, no suspicious masses, no skin or nipple changes or axillary nodes    PELVIC EXAM: normal external genitalia, vulva, vagina, cervix, uterus and adnexa    Assessment/Plan:       ICD-10-CM ICD-9-CM    1. Screening for malignant neoplasm of cervix  Z12.4 V76.2 PAP IG, APTIMA HPV AND RFX 16/18,45 ([id number])      2. Well woman exam with routine gynecological exam  Z01.419 V72.31     [V72.31]      3. Elevated blood pressure reading: Might be anxious as it was her first Pap. Repeat blood pressure has improved.   We will follow-up on next visit R03.0 796.2       Patient understood agreed with the plan  Flu shot from the pharmacy  Follow-up and Dispositions    Return in about 6 months (around 3/19/2023). .Please note that this dictation was completed with [email or web address], the computer voice recognition software. Quite often unanticipated grammatical, syntax, homophones, and other interpretive errors are inadvertently transcribed by the computer software. Please disregard these errors. Please excuse any errors that have escaped final proofreading.

## 2022-09-19 NOTE — PROGRESS NOTES
1. \"Have you been to the ER, urgent care clinic since your last visit? Hospitalized since your last visit? \" No    2. \"Have you seen or consulted any other health care providers outside of the [address] [place] [address] since your last visit? \" Eye exam [facility] LOV: 9/09/22 - told to use Omega 3 and artificial tears but has not started yet because she does not know what does to use. 3. For patients aged 39-70: Has the patient had a colonoscopy / FIT/ Cologuard? No      If the patient is female:    4. For patients aged 41-77: Has the patient had a mammogram within the past 2 years? No      5. For patients aged 21-65: Has the patient had a pap smear?  No - never done    Chief Complaint   Patient presents with    Well Woman

## 2022-11-29 ENCOUNTER — OFFICE VISIT (OUTPATIENT)
Dept: FAMILY MEDICINE CLINIC | Age: 27
End: 2022-11-29
Payer: COMMERCIAL

## 2022-11-29 VITALS
HEART RATE: 93 BPM | TEMPERATURE: 98.5 F | OXYGEN SATURATION: 98 % | DIASTOLIC BLOOD PRESSURE: 72 MMHG | RESPIRATION RATE: 18 BRPM | WEIGHT: 167.5 LBS | SYSTOLIC BLOOD PRESSURE: 122 MMHG | HEIGHT: 58 IN | BODY MASS INDEX: 35.16 KG/M2

## 2022-11-29 DIAGNOSIS — R45.89 FEELING SAD: ICD-10-CM

## 2022-11-29 DIAGNOSIS — G47.9 SLEEP DISTURBANCE: ICD-10-CM

## 2022-11-29 DIAGNOSIS — H61.23 BILATERAL IMPACTED CERUMEN: Primary | ICD-10-CM

## 2022-11-29 PROCEDURE — 99212 OFFICE O/P EST SF 10 MIN: CPT | Performed by: FAMILY MEDICINE

## 2022-11-29 NOTE — PROGRESS NOTES
HISTORY OF PRESENT ILLNESS  Kayla Solo is a 32 y.o. female. HPI: Here with a complaint of bilateral earache. Recently went to urgent care and diagnosed with otitis media. Just finished antibiotic yesterday. Tolerated it well. No abdominal pain or any bowel complaint. Sitting without any acute distress. No fever. No headache or dizziness. No URI symptoms at this time. No sore throat. No palpable lymphadenopathy. On exam noted bilateral cerumen impaction. Tympanic membrane not visualized. No edema over a year canal.  No nausea vomiting or abdominal pain. No urinary or bowel complaint. No chest pain no shortness of breath. No cold cough or wheezing. Visit Vitals  /72 (BP 1 Location: Right arm, BP Patient Position: Sitting, BP Cuff Size: Adult)   Pulse 93   Temp 98.5 °F (36.9 °C) (Temporal)   Resp 18   Ht 4' 10\" (1.473 m)   Wt 167 lb 8 oz (76 kg)   SpO2 98%   BMI 35.01 kg/m²     Review medication list, vitals, problem list,allergies. ROS: See HPI    Physical Exam  HENT:      Right Ear: There is impacted cerumen. Left Ear: There is impacted cerumen. Lymphadenopathy:      Cervical: No cervical adenopathy. Neurological:      Mental Status: She is alert and oriented to person, place, and time. ASSESSMENT and PLAN    ICD-10-CM ICD-9-CM    1. Bilateral impacted cerumen: No signs of infection. At this time sending to ENT for cerumen removal as it is very deep. Meantime advised to use Debrox over-the-counter H61.23 380.4 REFERRAL TO ENT-OTOLARYNGOLOGY      2. Feeling sad: On and off. Fairly stable mood. Will observe R45.89 300.4       3. Sleep disturbance  G47.9 780.50       Patient understood agreed with the plan  Follow-up and Dispositions    Return for as scheduled . Please note that this dictation was completed with [email or web address], the [employer] voice recognition software.   Quite often unanticipated grammatical, syntax, homophones, and other interpretive errors are inadvertently transcribed by the computer software. Please disregard these errors. Please excuse any errors that have escaped final proofreading.

## 2022-11-29 NOTE — PROGRESS NOTES
Chief Complaint   Patient presents with    Vitamin D Deficiency    Anxiety    Allergic Rhinitis    Ear Pain      Visit Vitals  /72 (BP 1 Location: Right arm, BP Patient Position: Sitting, BP Cuff Size: Adult)   Pulse 93   Temp 98.5 °F (36.9 °C) (Temporal)   Resp 18   Ht 4' 10\" (1.473 m)   Wt 167 lb 8 oz (76 kg)   SpO2 98%   BMI 35.01 kg/m²      PHQ 9 Score: 12 (11/29/2022 11:19 AM)  Thoughts of being better off dead, or hurting yourself in some way: 0 (11/29/2022 11:19 AM)     Abuse Screening Questionnaire 11/29/2022   Do you ever feel afraid of your partner? N   Are you in a relationship with someone who physically or mentally threatens you? N   Is it safe for you to go home? Y        Fall Risk Assessment, last 12 mths 11/29/2022   Able to walk? Yes   Fall in past 12 months? 0   Do you feel unsteady? 0   Are you worried about falling 0       1. \"Have you been to the ER, urgent care clinic since your last visit? Hospitalized since your last visit? \" Yes, [facility] Jorge Grant 66 NP-C     2. \"Have you seen or consulted any other health care providers outside of the [address] [place] [address] since your last visit? \" No     3. For patients aged 39-70: Has the patient had a colonoscopy / FIT/ Cologuard? NA - based on age      If the patient is female:    4. For patients aged 41-77: Has the patient had a mammogram within the past 2 years? NA - based on age or sex      11. For patients aged 21-65: Has the patient had a pap smear?  No

## 2023-03-27 ENCOUNTER — TELEPHONE (OUTPATIENT)
Age: 28
End: 2023-03-27

## 2023-03-27 NOTE — TELEPHONE ENCOUNTER
Spoke with patient (two identifiers verified) to follow-up on request to transfer medical record to [facility]. Patient was advised she will need to sign medical records release form in order for us to be able to send records to [facility]. She verbalized understanding; stated she will come to [facility] to sign release form. Her appointment with  [facility] is not until 6/2023.

## 2025-05-28 ENCOUNTER — HOSPITAL ENCOUNTER (OUTPATIENT)
Facility: HOSPITAL | Age: 30
Setting detail: RECURRING SERIES
Discharge: HOME | End: 2025-05-31
Payer: COMMERCIAL

## 2025-05-28 PROCEDURE — 97112 NEUROMUSCULAR REEDUCATION: CPT

## 2025-05-28 PROCEDURE — 97110 THERAPEUTIC EXERCISES: CPT

## 2025-05-28 PROCEDURE — 97161 PT EVAL LOW COMPLEX 20 MIN: CPT

## 2025-05-28 NOTE — PROGRESS NOTES
[place] [facility]  [address] [facility] [address] [place] [address] Ph:[phone] Fx: [phone]    PLAN OF CARE/ Statement of Necessity for Physical Therapy Services           Patient name: Vashti Albright Start of Care: 2025   Referral source: Sheyla Godfrey APRN -* : 1995    Medical Diagnosis: Dizziness and giddiness Onset Date: 2025   Treatment Diagnosis: R42   Dizziness and giddiness                                     Prior Hospitalization: see medical history Provider#: [id number]     Comorbidities: Other: depression    Prior Level of Function: functionally independent, no dizziness nor difficulty with balance     The Plan of Care and following information is based on the information from the initial evaluation.    Assessment / key information:  Pt is a 30 yo female who presents to [facility] with c/o unsteadiness on feet and recent onset of dizziness ~3 months ago. Pt reports dizziness is whose while at work (three computer screen) or with activities such as driving which has interfered with independence. Pt presents as unilateral hypofunction with d/dx of cervicogenic dizziness.  Patient demonstrates decreased ROM, impaired posture, impaired balance and stabitliy, and decreased functional mobility tolerance.     Evaluation Complexity:  History:  LOW Complexity : Zero comorbidities / personal factors that will impact the outcome / POC; Examination:  LOW Complexity : 1-2 Standardized tests and measures addressing body structure, function, activity limitation and / or participation in recreation  ;Presentation:  LOW Complexity : Stable, uncomplicated  ;Clinical Decision Making:  Other Assessment Form or Objective Measure: DHI  = LOW Complexity FOTO score = an established functional score where 100 = no disability  Overall Complexity Rating: LOW       Problem List: decrease ROM, impaired gait/balance, decrease ADL/functional abilities, decrease 
Reminder: bill using total billable min of TIMED therapeutic procedures (example: do not include dry needle or estim unattended, both untimed codes, in totals to left)  8-22 min = 1 unit; 23-37 min = 2 units; 38-52 min = 3 units; 53-67 min = 4 units; 68-82 min = 5 units   Total Total     TOTAL TREATMENT TIME:        40     [x]  Patient Education billed concurrently with other procedures   [x] Review HEP    [] Progressed/Changed HEP, detail:    [] Other detail:       General Evaluation    Posture: [x] Poor    [] Fair    [] Good    Describe:       Gait: [x] Normal    [] Abnormal    Device:      Description:    ROM/Strength           AROM                         PROM          Strength (1-5)  Cervical Left Right Left  Right Left Right   Flexion 42        Extension 30        Side bending 38 35       Rotation  65 45         Shoulder AROM: WNL     Balance/ Equilibrium:                Romberg Stance:         Eyes Open  Eyes Closed   Left  Firm 30   Right  Airex 30       Oculomotor Screen:       Smooth Pursuits:  [x] WNL   [] Abnormal: symptomatic       Spontaneous Nystagmus [x] WNL   [] Abnormal:      Gaze-evoked Nystagmus [x] WNL   [] Abnormal:      Test of skew:  [x] WNL   [] Abnormal:      Vertical Saccades:  [] WNL   [x] Abnormal: symptomatic       Horizontal Saccades  [x] WNL   [] Abnormal:      VOR Vertical  [] WNL   [x] Abnormal:symptomatic       VOR Horizontal  [] WNL   [x] Abnormal:      Head Impulse Test  [x] WNL   [] Abnormal:      Convergence  [] WNL   [] Abnormal:    Positional Testing:       Right Rena Hallpike:  [x] WNL   [] Abnormal:       Left Valyermo Hallpike:  [x] WNL   [] Abnormal:      Right Log Roll:    [x] WNL   [] Abnormal:      Left Log Roll:    [x] WNL   [] Abnormal:      Other Considerations     Tinnitus:    [] No    [x] Yes: >1year     Fullness in ear:   [] No    [x] Yes: Infrequent      Headache:    [x] No    [] Yes:     Changes in Hearing: [x] No    [] Yes:           Pain Level (0-10 scale) post

## 2025-06-03 ENCOUNTER — HOSPITAL ENCOUNTER (OUTPATIENT)
Facility: HOSPITAL | Age: 30
Setting detail: RECURRING SERIES
Discharge: HOME | End: 2025-06-06
Payer: COMMERCIAL

## 2025-06-03 PROCEDURE — 97140 MANUAL THERAPY 1/> REGIONS: CPT

## 2025-06-03 PROCEDURE — 97110 THERAPEUTIC EXERCISES: CPT

## 2025-06-03 PROCEDURE — 97112 NEUROMUSCULAR REEDUCATION: CPT

## 2025-06-03 NOTE — PROGRESS NOTES
PHYSICAL / OCCUPATIONAL THERAPY - DAILY TREATMENT NOTE     Patient Name: Vashti Albright    Date: 6/3/2025    : 1995  Insurance: Payor: AETNA / Plan: AETNA / Product Type: *No Product type* /      Patient  verified Yes     Visit #   Current / Total 2 24   Time   In / Out 257 345   Pain   In / Out 0 0   Subjective Functional Status/Changes: Pt reports no significant changes in symptoms. Reports VOR exercises horizontally have improved slightly. States dizziness is more notable while driving or at work (3 computer screens). States neck exercises have been difficulty to get in.      Changes to:  Allergies, Med Hx, Sx Hx?   no       TREATMENT AREA =  Dizziness [R42]    If an interpreting service is utilized for treatment of this patient, the contents of this document represent the material reviewed with the patient via the .     OBJECTIVE        Therapeutic Procedures:  Tx Min Billable or 1:1 Min (if diff from Tx Min) Procedure, Rationale, Specifics   12  40667 Therapeutic Exercise (timed):  increase ROM, strength, coordination, balance, and proprioception to improve patient's ability to progress to PLOF and address remaining functional goals. (see flow sheet as applicable)    Details if applicable:       24  12500 Neuromuscular Re-Education (timed):  improve balance, coordination, kinesthetic sense, posture, core stability and proprioception to improve patient's ability to develop conscious control of individual muscles and awareness of position of extremities in order to progress to PLOF and address remaining functional goals. (see flow sheet as applicable)    Details if applicable:     8  90565 Manual Therapy (timed):  increase ROM, increase tissue extensibility, and increase postural awareness to improve patient's ability to progress to PLOF and address remaining functional goals.  The manual therapy interventions were performed at a separate and distinct time from the therapeutic

## 2025-06-11 ENCOUNTER — HOSPITAL ENCOUNTER (OUTPATIENT)
Facility: HOSPITAL | Age: 30
Setting detail: RECURRING SERIES
Discharge: HOME | End: 2025-06-14
Payer: COMMERCIAL

## 2025-06-11 PROCEDURE — 97110 THERAPEUTIC EXERCISES: CPT

## 2025-06-11 PROCEDURE — 97530 THERAPEUTIC ACTIVITIES: CPT

## 2025-06-11 PROCEDURE — 97112 NEUROMUSCULAR REEDUCATION: CPT

## 2025-06-11 NOTE — PROGRESS NOTES
PHYSICAL / OCCUPATIONAL THERAPY - DAILY TREATMENT NOTE     Patient Name: Vashti Albright    Date: 2025    : 1995  Insurance: Payor: AETNA / Plan: AETNA / Product Type: *No Product type* /      Patient  verified Yes     Visit #   Current / Total 3 24   Time   In / Out 2:32 3:13   Pain   In / Out 0 0   Subjective Functional Status/Changes: Pt reported feeling o.k   Changes to:  Allergies, Med Hx, Sx Hx?   no       TREATMENT AREA =  Dizziness [R42]    If an interpreting service is utilized for treatment of this patient, the contents of this document represent the material reviewed with the patient via the .     OBJECTIVE      Therapeutic Procedures:  Tx Min Billable or 1:1 Min (if diff from Tx Min) Procedure, Rationale, Specifics   24  78997 Therapeutic Exercise (timed):  increase ROM, strength, coordination, balance, and proprioception to improve patient's ability to progress to PLOF and address remaining functional goals. (see flow sheet as applicable)    Details if applicable:       8  50950 Neuromuscular Re-Education (timed):  improve balance, coordination, kinesthetic sense, posture, core stability and proprioception to improve patient's ability to develop conscious control of individual muscles and awareness of position of extremities in order to progress to PLOF and address remaining functional goals. (see flow sheet as applicable)    Details if applicable:     9  23923 Therapeutic Activity (timed):  use of dynamic activities replicating functional movements to increase ROM, strength, coordination, balance, and proprioception in order to improve patient's ability to progress to PLOF and address remaining functional goals.  (see flow sheet as applicable)     Details if applicable:           Details if applicable:            Details if applicable:     41  [facility] Totals Reminder: bill using total billable min of TIMED therapeutic procedures (example: do not include dry needle or estim

## 2025-06-17 ENCOUNTER — HOSPITAL ENCOUNTER (OUTPATIENT)
Facility: HOSPITAL | Age: 30
Setting detail: RECURRING SERIES
Discharge: HOME | End: 2025-06-20
Payer: COMMERCIAL

## 2025-06-17 PROCEDURE — 97112 NEUROMUSCULAR REEDUCATION: CPT

## 2025-06-17 PROCEDURE — 97110 THERAPEUTIC EXERCISES: CPT

## 2025-06-17 NOTE — PROGRESS NOTES
PHYSICAL / OCCUPATIONAL THERAPY - DAILY TREATMENT NOTE     Patient Name: Vashti Albright    Date: 2025    : 1995  Insurance: Payor: AETNA / Plan: AETNA / Product Type: *No Product type* /      Patient  verified Yes     Visit #   Current / Total 4 24   Time   In / Out 2:32 3:10   Pain   In / Out 0 0   Subjective Functional Status/Changes: Patient reports no new complaints.   Changes to:  Allergies, Med Hx, Sx Hx?   no       TREATMENT AREA =  Dizziness [R42]    If an interpreting service is utilized for treatment of this patient, the contents of this document represent the material reviewed with the patient via the .     OBJECTIVE        Therapeutic Procedures:  Tx Min Billable or 1:1 Min (if diff from Tx Min) Procedure, Rationale, Specifics   15  92604 Therapeutic Exercise (timed):  increase ROM, strength, coordination, balance, and proprioception to improve patient's ability to progress to PLOF and address remaining functional goals. (see flow sheet as applicable)    Details if applicable:       23  26321 Neuromuscular Re-Education (timed):  improve balance, coordination, kinesthetic sense, posture, core stability and proprioception to improve patient's ability to develop conscious control of individual muscles and awareness of position of extremities in order to progress to PLOF and address remaining functional goals. (see flow sheet as applicable)    Details if applicable:     38  [facility] Totals Reminder: bill using total billable min of TIMED therapeutic procedures (example: do not include dry needle or estim unattended, both untimed codes, in totals to left)  8-22 min = 1 unit; 23-37 min = 2 units; 38-52 min = 3 units; 53-67 min = 4 units; 68-82 min = 5 units   Total Total     TOTAL TREATMENT TIME:        38     Charge Capture    [x]  Patient Education billed concurrently with other procedures   [x] Review HEP    [] Progressed/Changed HEP, detail:    [] Other detail:       Objective

## 2025-06-24 ENCOUNTER — HOSPITAL ENCOUNTER (OUTPATIENT)
Facility: HOSPITAL | Age: 30
Setting detail: RECURRING SERIES
Discharge: HOME | End: 2025-06-27
Payer: COMMERCIAL

## 2025-06-24 PROCEDURE — 97530 THERAPEUTIC ACTIVITIES: CPT

## 2025-06-24 PROCEDURE — 97110 THERAPEUTIC EXERCISES: CPT

## 2025-06-24 PROCEDURE — 97112 NEUROMUSCULAR REEDUCATION: CPT

## 2025-06-24 NOTE — PROGRESS NOTES
[place] [facility]  [address] [place] [address] - Ph: [phone]   Fx: [phone]    PHYSICAL THERAPY PROGRESS NOTE         Patient name: Vashti Albright Start of Care: 2025   Referral source: Sheyla Godfrey APRN -* : 1995               Medical Diagnosis: Dizziness and giddiness Onset Date: 2025   Treatment Diagnosis: R42   Dizziness and giddiness                                     Prior Hospitalization: see medical history Provider#: [id number]      Comorbidities: Other: depression     Prior Level of Function: functionally independent, no dizziness nor difficulty with balance     Reporting Period: 2025-2025  Visits from Start of Care: 5    Missed Visits: 0    Goals/Measure of Progress: To be achieved in 24 treatments:    Short Term Goals: To be accomplished in 8 treatments:  Patient will report compliance with HEP at least 1x/day to aid in rehabilitation program.  Status at IE: Provided at IE  PN: Pt reports continued compliance. 2025     Patient will improve cervical AROM as measured by rotation >70 degrees B  Status at IE: Left 65 Right 45  PN: left 58 degrees, right 60 degrees. 2025 Progressing     Long Term Goals: To be accomplished in 24 treatments:  Patient will decrease symptoms as measured by horizontal VOR x1 at 2hz for 45 seconds without symptom reproduction   Status at IE: <20sec  PN: 45 seconds without symptom reproduction or provocation. 2025 Met     Patient will improve functional mobility seen with decreased DHI </= 5  Status at IE: 14  PN: 6, 2025 Nearly met     Patient will be able to walk 60' with horizontal head turn without deviation outside 12\" nor significant slowing of gait.   Status at IE:Unable  PN: pt able to ambulate 60' while performing horizontal head turns with no significant change of pace but did leftwards deviate slightly outside of 12\" . 2025 Nearly met.

## 2025-06-24 NOTE — PROGRESS NOTES
PHYSICAL / OCCUPATIONAL THERAPY - DAILY TREATMENT NOTE     Patient Name: Vashti Albright    Date: 2025    : 1995  Insurance: Payor: AETNA / Plan: AETNA / Product Type: *No Product type* /      Patient  verified Yes     Visit #   Current / Total 5 24   Time   In / Out 2:30 3:09   Pain   In / Out 0/10 0/10   Subjective Functional Status/Changes: Patient reports their symptoms have improved.    Changes to:  Allergies, Med Hx, Sx Hx?   no       TREATMENT AREA =  Dizziness [R42]    If an interpreting service is utilized for treatment of this patient, the contents of this document represent the material reviewed with the patient via the .     OBJECTIVE        Therapeutic Procedures:  Tx Min Billable or 1:1 Min (if diff from Tx Min) Procedure, Rationale, Specifics   21  64719 Therapeutic Exercise (timed):  increase ROM, strength, coordination, balance, and proprioception to improve patient's ability to progress to PLOF and address remaining functional goals. (see flow sheet as applicable)    Details if applicable:       10  96912 Neuromuscular Re-Education (timed):  improve balance, coordination, kinesthetic sense, posture, core stability and proprioception to improve patient's ability to develop conscious control of individual muscles and awareness of position of extremities in order to progress to PLOF and address remaining functional goals. (see flow sheet as applicable)    Details if applicable:     8  01471 Therapeutic Activity (timed):  use of dynamic activities replicating functional movements to increase ROM, strength, coordination, balance, and proprioception in order to improve patient's ability to progress to PLOF and address remaining functional goals.  (see flow sheet as applicable)     Details if applicable:     39  [facility] Totals Reminder: bill using total billable min of TIMED therapeutic procedures (example: do not include dry needle or estim unattended, both untimed codes, in

## 2025-07-01 ENCOUNTER — HOSPITAL ENCOUNTER (OUTPATIENT)
Facility: HOSPITAL | Age: 30
Setting detail: RECURRING SERIES
Discharge: HOME | End: 2025-07-04
Payer: COMMERCIAL

## 2025-07-01 PROCEDURE — 97530 THERAPEUTIC ACTIVITIES: CPT

## 2025-07-01 PROCEDURE — 97112 NEUROMUSCULAR REEDUCATION: CPT

## 2025-07-01 PROCEDURE — 97110 THERAPEUTIC EXERCISES: CPT

## 2025-07-01 NOTE — PROGRESS NOTES
(example: do not include dry needle or estim unattended, both untimed codes, in totals to left)  8-22 min = 1 unit; 23-37 min = 2 units; 38-52 min = 3 units; 53-67 min = 4 units; 68-82 min = 5 units   Total Total     TOTAL TREATMENT TIME:        42     Charge Capture    [x]  Patient Education billed concurrently with other procedures   [x] Review HEP    [] Progressed/Changed HEP, detail:    [] Other detail:       Objective Information/Functional Measures/Assessment    Mild symptoms of balance deficit noted with horizontal head turns with VOR 1 standing and head turns with walking, No LOB or falls. Pt demonstrates good understanding of her condition and is compliant with the HEP. Reported improvement of her symptoms since the SOC. Tolerated the session well and left in no apparent distress.    Patient will continue to benefit from skilled PT / OT services to modify and progress therapeutic interventions, analyze and address functional mobility deficits, analyze and address ROM deficits, analyze and address strength deficits, analyze and address soft tissue restrictions, analyze and cue for proper movement patterns, analyze and modify for postural abnormalities, analyze and address imbalance/dizziness, and instruct in home and community integration to address functional deficits and attain remaining goals.    Progress toward goals / Updated goals:  []  See Progress Note/Recertification    Short Term Goals: To be accomplished in 8 treatments:  Patient will report compliance with HEP at least 1x/day to aid in rehabilitation program.  Status at IE: Provided at IE  PN: Pt reports continued compliance. 6/24/2025     Patient will improve cervical AROM as measured by rotation >70 degrees B  Status at IE: Left 65 Right 45             Current: B cervical rot AROM 60 deg, 07/01/2025  Long Term Goals: To be accomplished in 24 treatments:  Patient will decrease symptoms as measured by horizontal VOR x1 at 2hz for 45 seconds

## 2025-07-08 ENCOUNTER — HOSPITAL ENCOUNTER (OUTPATIENT)
Facility: HOSPITAL | Age: 30
Setting detail: RECURRING SERIES
Discharge: HOME | End: 2025-07-11
Payer: COMMERCIAL

## 2025-07-08 PROCEDURE — 97530 THERAPEUTIC ACTIVITIES: CPT

## 2025-07-08 PROCEDURE — 97110 THERAPEUTIC EXERCISES: CPT

## 2025-07-08 PROCEDURE — 97112 NEUROMUSCULAR REEDUCATION: CPT

## 2025-07-08 NOTE — PROGRESS NOTES
PHYSICAL / OCCUPATIONAL THERAPY - DAILY TREATMENT NOTE     Patient Name: Vashti Albright    Date: 2025    : 1995  Insurance: Payor: [employer] / Plan: [employer] / Product Type: *No Product type* /      Patient  verified Yes     Visit #   Current / Total 7 24   Time   In / Out 2:30 3:11   Pain   In / Out 0 0   Subjective Functional Status/Changes: Pt reported improvement in the frequency and intensity of her symptoms   Changes to:  Allergies, Med Hx, Sx Hx?   no       TREATMENT AREA =  Dizziness [R42]    If an interpreting service is utilized for treatment of this patient, the contents of this document represent the material reviewed with the patient via the .     OBJECTIVE      Therapeutic Procedures:  Tx Min Billable or 1:1 Min (if diff from Tx Min) Procedure, Rationale, Specifics   24  [id number] Therapeutic Exercise (timed):  increase ROM, strength, coordination, balance, and proprioception to improve patient's ability to progress to PLOF and address remaining functional goals. (see flow sheet as applicable)    Details if applicable:       9  [id number] Neuromuscular Re-Education (timed):  improve balance, coordination, kinesthetic sense, posture, core stability and proprioception to improve patient's ability to develop conscious control of individual muscles and awareness of position of extremities in order to progress to PLOF and address remaining functional goals. (see flow sheet as applicable)    Details if applicable:     8  [id number] Therapeutic Activity (timed):  use of dynamic activities replicating functional movements to increase ROM, strength, coordination, balance, and proprioception in order to improve patient's ability to progress to PLOF and address remaining functional goals.  (see flow sheet as applicable)     Details if applicable:           Details if applicable:            Details if applicable:     41  [facility] Totals Reminder: bill using total billable min of TIMED therapeutic procedures

## 2025-07-15 ENCOUNTER — HOSPITAL ENCOUNTER (OUTPATIENT)
Facility: HOSPITAL | Age: 30
Setting detail: RECURRING SERIES
Discharge: HOME | End: 2025-07-18
Payer: COMMERCIAL

## 2025-07-15 PROCEDURE — 97112 NEUROMUSCULAR REEDUCATION: CPT

## 2025-07-15 PROCEDURE — 97530 THERAPEUTIC ACTIVITIES: CPT

## 2025-07-15 PROCEDURE — 97110 THERAPEUTIC EXERCISES: CPT

## 2025-07-15 NOTE — PROGRESS NOTES
PHYSICAL / OCCUPATIONAL THERAPY - DAILY TREATMENT NOTE     Patient Name: Vashti Albright    Date: 7/15/2025    : 1995  Insurance: Payor: AET[employer] / Plan: AET[employer] / Product Type: *No Product type* /      Patient  verified Yes     Visit #   Current / Total 8 24   Time   In / Out 2:29 3:09   Pain   In / Out 0 0   Subjective Functional Status/Changes: Pt made a list of frequency and duration of the dizziness attacks in the last week   Changes to:  Allergies, Med Hx, Sx Hx?   no       TREATMENT AREA =  Dizziness [R42]    If an interpreting service is utilized for treatment of this patient, the contents of this document represent the material reviewed with the patient via the .     OBJECTIVE    Therapeutic Procedures:  Tx Min Billable or 1:1 Min (if diff from Tx Min) Procedure, Rationale, Specifics   24  [id number] Therapeutic Exercise (timed):  increase ROM, strength, coordination, balance, and proprioception to improve patient's ability to progress to PLOF and address remaining functional goals. (see flow sheet as applicable)    Details if applicable:       8  09499 Neuromuscular Re-Education (timed):  improve balance, coordination, kinesthetic sense, posture, core stability and proprioception to improve patient's ability to develop conscious control of individual muscles and awareness of position of extremities in order to progress to PLOF and address remaining functional goals. (see flow sheet as applicable)    Details if applicable:     8  [id number] Therapeutic Activity (timed):  use of dynamic activities replicating functional movements to increase ROM, strength, coordination, balance, and proprioception in order to improve patient's ability to progress to PLOF and address remaining functional goals.  (see flow sheet as applicable)     Details if applicable:           Details if applicable:            Details if applicable:     40  [facility] Totals Reminder: bill using total billable min of TIMED therapeutic

## 2025-07-22 ENCOUNTER — HOSPITAL ENCOUNTER (OUTPATIENT)
Facility: HOSPITAL | Age: 30
Setting detail: RECURRING SERIES
Discharge: HOME | End: 2025-07-25
Payer: COMMERCIAL

## 2025-07-22 PROCEDURE — 97112 NEUROMUSCULAR REEDUCATION: CPT

## 2025-07-22 PROCEDURE — 97530 THERAPEUTIC ACTIVITIES: CPT

## 2025-07-22 PROCEDURE — 97110 THERAPEUTIC EXERCISES: CPT

## 2025-07-22 NOTE — PROGRESS NOTES
PHYSICAL / OCCUPATIONAL THERAPY - DAILY TREATMENT NOTE     Patient Name: Vashti Albright    Date: 2025    : 1995  Insurance: Payor: AET[employer] / Plan: AET[employer] / Product Type: *No Product type* /      Patient  verified Yes     Visit #   Current / Total 9 24   Time   In / Out 2:28 3:09   Pain   In / Out 0 0   Subjective Functional Status/Changes: No new changes     TREATMENT AREA =  Dizziness and giddiness    If an interpreting service is utilized for treatment of this patient, the contents of this document represent the material reviewed with the patient via the .     OBJECTIVE      Therapeutic Procedures:  Tx Min Billable or 1:1 Min (if diff from Tx Min) Procedure, Rationale, Specifics   24  98532 Therapeutic Exercise (timed):  increase ROM, strength, coordination, balance, and proprioception to improve patient's ability to progress to PLOF and address remaining functional goals. (see flow sheet as applicable)    Details if applicable:       9  97463 Neuromuscular Re-Education (timed):  improve balance, coordination, kinesthetic sense, posture, core stability and proprioception to improve patient's ability to develop conscious control of individual muscles and awareness of position of extremities in order to progress to PLOF and address remaining functional goals. (see flow sheet as applicable)    Details if applicable:     8  41953 Therapeutic Activity (timed):  use of dynamic activities replicating functional movements to increase ROM, strength, coordination, balance, and proprioception in order to improve patient's ability to progress to PLOF and address remaining functional goals.  (see flow sheet as applicable)     Details if applicable:           Details if applicable:            Details if applicable:     41  [facility] Totals Reminder: bill using total billable min of TIMED therapeutic procedures (example: do not include dry needle or estim unattended, both untimed codes, in totals to

## 2025-07-29 ENCOUNTER — HOSPITAL ENCOUNTER (OUTPATIENT)
Facility: HOSPITAL | Age: 30
Setting detail: RECURRING SERIES
Discharge: HOME | End: 2025-08-01
Payer: COMMERCIAL

## 2025-07-29 PROCEDURE — 97110 THERAPEUTIC EXERCISES: CPT

## 2025-07-29 PROCEDURE — 97530 THERAPEUTIC ACTIVITIES: CPT

## 2025-07-29 PROCEDURE — 97112 NEUROMUSCULAR REEDUCATION: CPT

## (undated) DEVICE — SUTURE MCRYL SZ 4-0 L27IN ABSRB UD L24MM PS-1 3/8 CIR PRIM Y935H

## (undated) DEVICE — REM POLYHESIVE ADULT PATIENT RETURN ELECTRODE: Brand: VALLEYLAB

## (undated) DEVICE — SMOKE EVACUATION PENCIL: Brand: VALLEYLAB

## (undated) DEVICE — 3M™ TEGADERM™ TRANSPARENT FILM DRESSING FRAME STYLE, 1626W, 4 IN X 4-3/4 IN (10 CM X 12 CM), 50/CT 4CT/CASE: Brand: 3M™ TEGADERM™

## (undated) DEVICE — SPONGE LAP 18X18IN STRL -- 5/PK

## (undated) DEVICE — DRAPE TOWEL: Brand: CONVERTORS

## (undated) DEVICE — (D)PREP SKN CHLRAPRP APPL 26ML -- CONVERT TO ITEM 371833

## (undated) DEVICE — 3M™ STERI-STRIP™ COMPOUND BENZOIN TINCTURE 40 BAGS/CARTON 4 CARTONS/CASE C1544: Brand: 3M™ STERI-STRIP™

## (undated) DEVICE — INTENDED FOR TISSUE SEPARATION, AND OTHER PROCEDURES THAT REQUIRE A SHARP SURGICAL BLADE TO PUNCTURE OR CUT.: Brand: BARD-PARKER SAFETY BLADES SIZE 15, STERILE

## (undated) DEVICE — STERILE POLYISOPRENE POWDER-FREE SURGICAL GLOVES: Brand: PROTEXIS

## (undated) DEVICE — SYR 10ML LUER LOK 1/5ML GRAD --

## (undated) DEVICE — GAUZE SPONGES,USP TYPE VII GAUZE, 12 PLY: Brand: CURITY

## (undated) DEVICE — X-RAY SPONGES,12 PLY: Brand: DERMACEA

## (undated) DEVICE — FLEX ADVANTAGE 3000CC: Brand: FLEX ADVANTAGE

## (undated) DEVICE — (D)STRIP SKN CLSR 0.5X4IN WHT --

## (undated) DEVICE — SUTURE VCRL SZ 3-0 L18IN ABSRB UD POLYGLACTIN 910 BRAID TIE J910T

## (undated) DEVICE — GOWN,SIRUS,POLYRNF,BRTHSLV,LG,30/CS: Brand: MEDLINE

## (undated) DEVICE — 3M™ BAIR PAWS FLEX™ WARMING GOWN, STANDARD, 20 PER CASE 81003: Brand: BAIR PAWS™

## (undated) DEVICE — NEEDLE HYPO 25GA L1.5IN BVL ORIENTED ECLIPSE

## (undated) DEVICE — SUTURE VCRL SZ 3-0 L27IN ABSRB UD L26MM SH 1/2 CIR J416H

## (undated) DEVICE — KENDALL SCD EXPRESS SLEEVES, KNEE LENGTH, MEDIUM: Brand: KENDALL SCD

## (undated) DEVICE — INTENDED FOR TISSUE SEPARATION, AND OTHER PROCEDURES THAT REQUIRE A SHARP SURGICAL BLADE TO PUNCTURE OR CUT.: Brand: BARD-PARKER SAFETY BLADES SIZE 10, STERILE

## (undated) DEVICE — PACK PROCEDURE SURG MAJ W/ BASIN LF

## (undated) DEVICE — THREE-QUARTER SHEET: Brand: CONVERTORS

## (undated) DEVICE — KIT CLN UP BON SECOURS MARYV

## (undated) DEVICE — SUTURE MCRYL SZ 4-0 L18IN ABSRB UD L19MM PS-2 3/8 CIR PRIM Y496G

## (undated) DEVICE — GLOVE SURG SZ 8 L11.77IN FNGR THK9.8MIL STRW LTX POLYMER

## (undated) DEVICE — APPLICATOR BNDG 1MM ADH PREMIERPRO EXOFIN

## (undated) DEVICE — GOWN,SIRUS,POLYRNF,BRTHSLV,XL,30/CS: Brand: MEDLINE

## (undated) DEVICE — GAUZE SPONGES,16 PLY: Brand: CURITY